# Patient Record
Sex: MALE | Race: WHITE | Employment: FULL TIME | URBAN - NONMETROPOLITAN AREA
[De-identification: names, ages, dates, MRNs, and addresses within clinical notes are randomized per-mention and may not be internally consistent; named-entity substitution may affect disease eponyms.]

---

## 2017-01-11 ENCOUNTER — OFFICE VISIT (OUTPATIENT)
Dept: OTHER | Age: 62
End: 2017-01-11

## 2017-01-11 VITALS
DIASTOLIC BLOOD PRESSURE: 58 MMHG | WEIGHT: 226 LBS | BODY MASS INDEX: 29.95 KG/M2 | HEIGHT: 73 IN | SYSTOLIC BLOOD PRESSURE: 102 MMHG | HEART RATE: 72 BPM

## 2017-01-11 DIAGNOSIS — Z23 NEED FOR IMMUNIZATION AGAINST VIRAL HEPATITIS: ICD-10-CM

## 2017-01-11 DIAGNOSIS — E11.9 TYPE 2 DIABETES MELLITUS WITHOUT COMPLICATION, WITH LONG-TERM CURRENT USE OF INSULIN (HCC): Primary | Chronic | ICD-10-CM

## 2017-01-11 DIAGNOSIS — Z79.4 TYPE 2 DIABETES MELLITUS WITHOUT COMPLICATION, WITH LONG-TERM CURRENT USE OF INSULIN (HCC): Primary | Chronic | ICD-10-CM

## 2017-01-11 PROCEDURE — G0108 DIAB MANAGE TRN  PER INDIV: HCPCS | Performed by: REGISTERED NURSE

## 2017-01-11 PROCEDURE — 90471 IMMUNIZATION ADMIN: CPT | Performed by: REGISTERED NURSE

## 2017-01-11 PROCEDURE — 90746 HEPB VACCINE 3 DOSE ADULT IM: CPT | Performed by: REGISTERED NURSE

## 2017-01-12 ENCOUNTER — TELEPHONE (OUTPATIENT)
Dept: OTHER | Age: 62
End: 2017-01-12

## 2017-04-21 LAB
AVERAGE GLUCOSE: NORMAL
HBA1C MFR BLD: 6.6 %

## 2017-07-19 ENCOUNTER — HOSPITAL ENCOUNTER (OUTPATIENT)
Dept: PHARMACY | Age: 62
Setting detail: THERAPIES SERIES
Discharge: HOME | End: 2017-07-19
Payer: COMMERCIAL

## 2017-07-19 VITALS
DIASTOLIC BLOOD PRESSURE: 67 MMHG | HEART RATE: 70 BPM | BODY MASS INDEX: 29.29 KG/M2 | SYSTOLIC BLOOD PRESSURE: 118 MMHG | WEIGHT: 222 LBS

## 2017-07-19 PROCEDURE — 90471 IMMUNIZATION ADMIN: CPT

## 2017-07-19 PROCEDURE — G0463 HOSPITAL OUTPT CLINIC VISIT: HCPCS

## 2017-07-19 PROCEDURE — G0010 ADMIN HEPATITIS B VACCINE: HCPCS

## 2017-07-19 PROCEDURE — 6360000002 HC RX W HCPCS

## 2017-07-19 PROCEDURE — 90743 HEPB VACC 2 DOSE ADOLESC IM: CPT

## 2017-07-19 PROCEDURE — 96372 THER/PROPH/DIAG INJ SC/IM: CPT

## 2017-07-19 RX ADMIN — HEPATITIS B VACCINE (RECOMBINANT) 10 MCG: 10 INJECTION, SUSPENSION INTRAMUSCULAR; SUBCUTANEOUS at 16:33

## 2018-01-17 ENCOUNTER — HOSPITAL ENCOUNTER (OUTPATIENT)
Dept: PHARMACY | Age: 63
Setting detail: THERAPIES SERIES
Discharge: HOME | End: 2018-01-17
Payer: COMMERCIAL

## 2018-01-17 VITALS
DIASTOLIC BLOOD PRESSURE: 60 MMHG | SYSTOLIC BLOOD PRESSURE: 122 MMHG | WEIGHT: 220.2 LBS | BODY MASS INDEX: 29.18 KG/M2 | HEIGHT: 73 IN

## 2018-01-17 PROCEDURE — G0108 DIAB MANAGE TRN  PER INDIV: HCPCS | Performed by: REGISTERED NURSE

## 2018-01-17 NOTE — PROGRESS NOTES
Diabetes Clinic at [address] [place]. [address], [address]  [phone] (phone)  [phone] (fax)    Patient ID: Eulogio Quinn 1955  Referring Provider: Dr. Lv Yarbrough     Patient's name and  were verified. Subjective:    He presents for His follow-up diabetic visit. He has type 2 diabetes mellitus. Home regimen includes: insulin  biguanide  TZD  GLP-1 agonist He is noncompliant some of the time. Assessment:     Lab Results   Component Value Date    LABA1C 6.6 2017    BUN 26 2015    CREATININE 1.2 2015     There were no vitals filed for this visit. Wt Readings from Last 3 Encounters:   17 222 lb (100.7 kg)   17 226 lb (102.5 kg)   16 226 lb 3.2 oz (102.6 kg)     Ht Readings from Last 3 Encounters:   17 6' 1\" (1.854 m)   11/18/15 6' 1\" (1.854 m)   01/14/15 6' 1\" (1.854 m)       Glucose at 4 hrs PPD today resulted at 72mg/dl. Roll of smarties. Current monitoring regimen: home blood tests - 2 times daily  Home blood sugar trends: FBS's . -163  Any episodes of hypoglycemia? no  Previous visit with dietician: remote  Current diet: B 6am cherrios                       L  [place] 1 bread/ cheese/ apple/ banana                       D 5-6pm 15 in pizza or bkfst sandwich/ chips  Current exercise: ADL's- moderate activity. workss 5-- 8 hr days  Eye exam current (within one year): yes Emma Jewell [facility]. 2017  Any history of foot problems? no  Last foot exam: 18 Pedal pulses:   peripheral pulses symmetrical   Results of monofilament test: 10/10              Skin noted to be cool, dusky and shiny and hair is absent. Denies N/T but does have pain in arch of feet/ ankles with weight bearing  Immunizations up to date: yes -   Taking ASA:  Yes   Appropriate for use of MyChart Glucose Grid:  No    Focus: Follow up visit. At this time Devonte's BS's are in goal fasting and bedtime.  He reports that his most recent A1C at his PCP

## 2018-06-27 LAB
AVERAGE GLUCOSE: NORMAL
HBA1C MFR BLD: 6.3 %

## 2018-07-18 ENCOUNTER — NURSE ONLY (OUTPATIENT)
Dept: INTERNAL MEDICINE CLINIC | Age: 63
End: 2018-07-18
Payer: COMMERCIAL

## 2018-07-18 VITALS
WEIGHT: 223.6 LBS | BODY MASS INDEX: 29.63 KG/M2 | SYSTOLIC BLOOD PRESSURE: 116 MMHG | DIASTOLIC BLOOD PRESSURE: 60 MMHG | HEIGHT: 73 IN

## 2018-07-18 DIAGNOSIS — Z79.4 TYPE 2 DIABETES MELLITUS WITHOUT COMPLICATION, WITH LONG-TERM CURRENT USE OF INSULIN (HCC): Chronic | ICD-10-CM

## 2018-07-18 DIAGNOSIS — E11.9 TYPE 2 DIABETES MELLITUS WITHOUT COMPLICATION, WITH LONG-TERM CURRENT USE OF INSULIN (HCC): Chronic | ICD-10-CM

## 2018-07-18 PROCEDURE — 99999 PR OFFICE/OUTPT VISIT,PROCEDURE ONLY: CPT | Performed by: NURSE PRACTITIONER

## 2018-07-18 PROCEDURE — G0108 DIAB MANAGE TRN  PER INDIV: HCPCS | Performed by: NURSE PRACTITIONER

## 2018-07-18 RX ORDER — INSULIN DETEMIR 100 [IU]/ML
15 INJECTION, SOLUTION SUBCUTANEOUS NIGHTLY
COMMUNITY
Start: 2018-07-05

## 2018-07-18 RX ORDER — LIRAGLUTIDE 6 MG/ML
1.2 INJECTION SUBCUTANEOUS DAILY
COMMUNITY
Start: 2018-07-05 | End: 2019-01-16 | Stop reason: SDUPTHER

## 2018-07-18 NOTE — PROGRESS NOTES
especially with age. Also reviewed limits to excess carb intake-thinking smart. Encouragment given. Follow up 6 months. DSME PLAN:   Discussed general issues about diabetes pathophysiology and management. Counseling at today's visit: carb goals; exercise; medication action. 1. Stay focused on 3 meals per day and 60 grams carb per meal          Limit splurges to less than once weekly  2. Get exercise program in place              Monday through Friday--as soon as you get home from              [facility] [place] your bike 10-15 minutes until dinner is ready  3. Victoza--needs to be taken daily in AM 1.2mg   Any questions, call! [phone]    Meter download, medications, PMH and nursing assessment reviewed. Sameer Pimentel states He is willing to participate in this plan of care and verbalized understanding of all instructions provided. Teach back used to verify comprehension. Total time involved in direct patient education: 60 minutes.

## 2019-01-16 ENCOUNTER — TELEPHONE (OUTPATIENT)
Dept: CARE COORDINATION | Age: 64
End: 2019-01-16

## 2019-01-16 ENCOUNTER — OFFICE VISIT (OUTPATIENT)
Dept: INTERNAL MEDICINE CLINIC | Age: 64
End: 2019-01-16
Payer: COMMERCIAL

## 2019-01-16 VITALS
SYSTOLIC BLOOD PRESSURE: 116 MMHG | WEIGHT: 217 LBS | DIASTOLIC BLOOD PRESSURE: 64 MMHG | HEIGHT: 73 IN | BODY MASS INDEX: 28.76 KG/M2

## 2019-01-16 DIAGNOSIS — E11.9 TYPE 2 DIABETES MELLITUS WITHOUT COMPLICATION, WITH LONG-TERM CURRENT USE OF INSULIN (HCC): Chronic | ICD-10-CM

## 2019-01-16 DIAGNOSIS — Z79.4 TYPE 2 DIABETES MELLITUS WITHOUT COMPLICATION, WITH LONG-TERM CURRENT USE OF INSULIN (HCC): Chronic | ICD-10-CM

## 2019-01-16 PROCEDURE — G0108 DIAB MANAGE TRN  PER INDIV: HCPCS | Performed by: INTERNAL MEDICINE

## 2019-02-11 LAB
AVERAGE GLUCOSE: NORMAL
HBA1C MFR BLD: 6.5 %

## 2019-05-01 ENCOUNTER — OFFICE VISIT (OUTPATIENT)
Dept: INTERNAL MEDICINE CLINIC | Age: 64
End: 2019-05-01
Payer: MEDICARE

## 2019-05-01 VITALS
WEIGHT: 219.5 LBS | HEIGHT: 73 IN | SYSTOLIC BLOOD PRESSURE: 138 MMHG | DIASTOLIC BLOOD PRESSURE: 63 MMHG | BODY MASS INDEX: 29.09 KG/M2

## 2019-05-01 DIAGNOSIS — Z79.4 TYPE 2 DIABETES MELLITUS WITHOUT COMPLICATION, WITH LONG-TERM CURRENT USE OF INSULIN (HCC): Chronic | ICD-10-CM

## 2019-05-01 DIAGNOSIS — E11.9 TYPE 2 DIABETES MELLITUS WITHOUT COMPLICATION, WITH LONG-TERM CURRENT USE OF INSULIN (HCC): Chronic | ICD-10-CM

## 2019-05-01 PROCEDURE — G0108 DIAB MANAGE TRN  PER INDIV: HCPCS | Performed by: INTERNAL MEDICINE

## 2019-05-01 NOTE — PATIENT INSTRUCTIONS
Continue with your exercise program and your medication  Watch for low blood sugars.  2-3 times per week is too often to have lows --report to the clinic  Keep an eye on calories in your daily meal plan            -substitute fruit or yogurt for chips once in a while            -limit cookies to 2 vs 4 and add sugar free jello            -limit high fat meats and fried foods  Keep up the great work!!

## 2019-05-01 NOTE — PROGRESS NOTES
[facility]  [facility] [address] [facility], [address]  [phone] (phone)  [phone] (fax)    Patient ID: Aron Paget 1955  Referring Provider: Dr. Polo Giles     Patient's name and  were verified. Subjective:    He presents for His follow-up diabetic visit. He has type 2 diabetes mellitus. Home regimen includes: insulin  biguanide  GLP-1 agonist and TZD. He is compliant most of the time. Assessment:     Lab Results   Component Value Date    LABA1C 6.3 2018    BUN 26 2015    CREATININE 1.2 2015     Vitals:    19 1015   BP: 138/63   Site: Right Upper Arm   Position: Sitting   Weight: 219 lb 8 oz (99.6 kg)   Height: 6' 1\" (1.854 m)     Wt Readings from Last 3 Encounters:   19 219 lb 8 oz (99.6 kg)   19 217 lb (98.4 kg)   18 223 lb 9.6 oz (101.4 kg)     Ht Readings from Last 3 Encounters:   19 6' 1\" (1.854 m)   19 6' 1\" (1.854 m)   18 6' 1\" (1.854 m)       Current monitoring regimen: home blood tests - 2 times daily  Home blood sugar trends: FBS's . Bedtime 118-230 (varied)  Any episodes of hypoglycemia? no  Previous visit with dietician: remote  Current diet: B- cherrios/ coffee                       L- sandwich/ potato chips                       D- Sausage/ fried potato/ mixed veggie/ brocoli                       Snacks--cookies/ candy  Current exercise: 30 mins 5 times per week; recumbant bike  Eye exam current (within one year): yes 10/19/18 Dr. Charisma Mcgee  Any history of foot problems? no  Last foot exam: 19  Immunizations up to date: yes -   Taking ASA:  Yes   Appropriate for use of MyChart Glucose Grid:  No    Focus: Follow up visit. Harrison Rivera has been retired for approximately 3 months. He is doing quite well. He is riding his recumbent bike 5 of 7 days. There are areas of his meal plan that can be addressed, but he declines seeing a dietician.  Reviewed reducing intake of high fat meats/ fried foods and sweets. Also reviewed how these choices cause weight gain/ increase strain on pancreas function. Weight is up 2# today. Much encouragement given. Will follow up 6 months. DSME PLAN:   Discussed general issues about diabetes pathophysiology and management. Counseling at today's visit: BG goals; exercise; carbs; preserving beta cell fx. Continue with your exercise program and your medication  Watch for low blood sugars. 2-3 times per week is too often to have lows --report to the clinic  Keep an eye on calories in your daily meal plan            -substitute fruit or yogurt for chips once in a while            -limit cookies to 2 vs 4 and add sugar free jello            -limit high fat meats and fried foods  Keep up the great work!!  Meter download, medications, PMH and nursing assessment reviewed. Ramandeep De La Cruz states He is willing to participate in this plan of care and verbalized understanding of all instructions provided. Teach back used to verify comprehension.       Total time involved in direct patient education: 30 minutes. '

## 2019-08-09 LAB
AVERAGE GLUCOSE: NORMAL
HBA1C MFR BLD: 6.3 %

## 2019-11-06 ENCOUNTER — OFFICE VISIT (OUTPATIENT)
Dept: INTERNAL MEDICINE CLINIC | Age: 64
End: 2019-11-06
Payer: MEDICARE

## 2019-11-06 VITALS
DIASTOLIC BLOOD PRESSURE: 58 MMHG | WEIGHT: 212 LBS | HEIGHT: 73 IN | BODY MASS INDEX: 28.1 KG/M2 | SYSTOLIC BLOOD PRESSURE: 124 MMHG

## 2019-11-06 DIAGNOSIS — Z23 NEED FOR PROPHYLACTIC VACCINATION AND INOCULATION AGAINST INFLUENZA: Primary | ICD-10-CM

## 2019-11-06 PROCEDURE — 90653 IIV ADJUVANT VACCINE IM: CPT | Performed by: INTERNAL MEDICINE

## 2019-11-06 PROCEDURE — G0008 ADMIN INFLUENZA VIRUS VAC: HCPCS | Performed by: INTERNAL MEDICINE

## 2019-11-06 PROCEDURE — G0108 DIAB MANAGE TRN  PER INDIV: HCPCS | Performed by: INTERNAL MEDICINE

## 2020-02-07 LAB
AVERAGE GLUCOSE: NORMAL
HBA1C MFR BLD: 5.9 %

## 2020-02-08 LAB
CREATININE, URINE: 190.71
MICROALBUMIN/CREAT 24H UR: 0.7 MG/G{CREAT}
MICROALBUMIN/CREAT UR-RTO: 3.7

## 2020-08-05 ENCOUNTER — OFFICE VISIT (OUTPATIENT)
Dept: INTERNAL MEDICINE CLINIC | Age: 65
End: 2020-08-05
Payer: MEDICARE

## 2020-08-05 VITALS
SYSTOLIC BLOOD PRESSURE: 128 MMHG | DIASTOLIC BLOOD PRESSURE: 61 MMHG | WEIGHT: 219 LBS | HEART RATE: 65 BPM | BODY MASS INDEX: 29.03 KG/M2 | TEMPERATURE: 97.8 F | HEIGHT: 73 IN

## 2020-08-05 LAB — HBA1C MFR BLD: 6.5 % (ref 4.3–5.7)

## 2020-08-05 PROCEDURE — G0108 DIAB MANAGE TRN  PER INDIV: HCPCS | Performed by: INTERNAL MEDICINE

## 2020-08-05 PROCEDURE — 83036 HEMOGLOBIN GLYCOSYLATED A1C: CPT | Performed by: INTERNAL MEDICINE

## 2020-08-05 RX ORDER — SERTRALINE HYDROCHLORIDE 100 MG/1
100 TABLET, FILM COATED ORAL DAILY
COMMUNITY
Start: 2020-05-04

## 2020-08-05 NOTE — PROGRESS NOTES
[facility]  [address] [facility], [place], [address]  [phone] (phone)  [phone] (fax)    Patient ID: Dipika Lynn 1955  Referring Provider: Dr. Veronica Pappas     Patient's name and  were verified. Subjective:    He presents for His follow-up diabetic visit. He has type 2 diabetes mellitus. Home regimen includes: insulin  biguanide  TZD  GLP-1 agonist He is compliant most of the time. Assessment:     Lab Results   Component Value Date    LABA1C 6.3 2019    BUN 26 2015    CREATININE 1.2 2015     There were no vitals filed for this visit. Wt Readings from Last 3 Encounters:   19 212 lb (96.2 kg)   19 219 lb 8 oz (99.6 kg)   19 217 lb (98.4 kg)     Ht Readings from Last 3 Encounters:   19 6' 1\" (1.854 m)   19 6' 1\" (1.854 m)   19 6' 1\" (1.854 m)       Glucose at 3 hrs PPD today resulted at 120mg/dl  Current monitoring regimen: home blood tests - 2 times daily  Home blood sugar trends: FBS's        Bedtime 110-156, 187, 200, 186  Any episodes of hypoglycemia? no  Previous visit with dietician: remote  Current diet: B cherrios/ coffee                  L mac n cheese 2 cups                  D cheese burger/ fries/ ice cone                 Snacks - nuts  Current exercise: ADL's moderate activity; got away from recumbant bike daily  Eye exam current (within one year): yes Dr. Jhonny Lopes 19  Any history of foot problems? no  Last foot exam: 2020 Pedal pulses:   peripheral pulses symmetrical   Results of monofilament test: 10/10              Skin noted to be warm, pale and hair is absent. Immunizations up to date: yes - 2019  Taking ASA:  Yes   Appropriate for use of MyChart Glucose Grid:  No    Focus:     Diabetes education. Faviola Wells continues to do well with glucose control, overall. He is up 7# from last visit and exercise has been put on hold. A1C today 6.5%.  Focused on keeping glucose levels in goal with resuming exercise efforts and getting weight back down again. Soni Contreras is receptive to taking this approach. Encouragement given. Follow up 6 months. DSME PLAN:   Discussed general issues about diabetes pathophysiology and management. Counseling at today's visit: BG goals; carbs; exercise; weight loss. Get back into riding your recumbant bike at least 15-20 minutes most days  Watch your serving sizes! Try to limit meals to 4 servings carbohydrates at meals (60gms)                             Limit snacking on nuts to 1 handful  Set a goal to get your weight down the 7 pounds that you have recently gained      Meter download, medications, PMH and nursing assessment reviewed. Veleta Runner states He is willing to participate in this plan of care and verbalized understanding of all instructions provided. Teach back used to verify comprehension. Total time involved in direct patient education: 30 minutes.

## 2020-09-22 LAB
BUN BLDV-MCNC: 14 MG/DL
CALCIUM SERPL-MCNC: 9.5 MG/DL
CHLORIDE BLD-SCNC: 102 MMOL/L
CO2: 32 MMOL/L
CREAT SERPL-MCNC: 0.82 MG/DL
GFR CALCULATED: >60
GLUCOSE BLD-MCNC: 107 MG/DL
POTASSIUM SERPL-SCNC: 4.4 MMOL/L
SODIUM BLD-SCNC: 142 MMOL/L

## 2021-02-10 ENCOUNTER — OFFICE VISIT (OUTPATIENT)
Dept: INTERNAL MEDICINE CLINIC | Age: 66
End: 2021-02-10
Payer: MEDICARE

## 2021-02-10 VITALS
BODY MASS INDEX: 28.89 KG/M2 | HEART RATE: 70 BPM | WEIGHT: 218 LBS | SYSTOLIC BLOOD PRESSURE: 130 MMHG | DIASTOLIC BLOOD PRESSURE: 73 MMHG | TEMPERATURE: 97.2 F | HEIGHT: 73 IN

## 2021-02-10 DIAGNOSIS — Z79.4 TYPE 2 DIABETES MELLITUS WITHOUT COMPLICATION, WITH LONG-TERM CURRENT USE OF INSULIN (HCC): Primary | ICD-10-CM

## 2021-02-10 DIAGNOSIS — E11.9 TYPE 2 DIABETES MELLITUS WITHOUT COMPLICATION, WITH LONG-TERM CURRENT USE OF INSULIN (HCC): Primary | ICD-10-CM

## 2021-02-10 LAB — HBA1C MFR BLD: 6.7 % (ref 4.3–5.7)

## 2021-02-10 PROCEDURE — 83036 HEMOGLOBIN GLYCOSYLATED A1C: CPT | Performed by: INTERNAL MEDICINE

## 2021-02-10 PROCEDURE — G0108 DIAB MANAGE TRN  PER INDIV: HCPCS | Performed by: INTERNAL MEDICINE

## 2021-02-10 NOTE — PROGRESS NOTES
[facility]  [address] [facility], [address]  [phone] (phone)  [phone] (fax)    Patient ID: Moreno Cha 1955  Referring Provider: Dr. Debra Gomez     Patient's name and  were verified. Subjective:    He presents for His follow-up diabetic visit. He has type 2 diabetes mellitus. Home regimen includes: insulin  biguanide  TZD  GLP-1 agonist He is noncompliant some of the time. Assessment:     Lab Results   Component Value Date    LABA1C 6.5 2020    LABA1C 6.3 2019    BUN 26 2015    CREATININE 1.2 2015     There were no vitals filed for this visit. Wt Readings from Last 3 Encounters:   20 219 lb (99.3 kg)   19 212 lb (96.2 kg)   19 219 lb 8 oz (99.6 kg)     Ht Readings from Last 3 Encounters:   20 6' 1\" (1.854 m)   19 6' 1\" (1.854 m)   19 6' 1\" (1.854 m)       Glucose at 2.5 hrs PPD today resulted at 119mg/dl  Current monitoring regimen: home blood tests - 2 times daily  Home blood sugar trends: FBS's . Bedtime 109-156  Any episodes of hypoglycemia? no  Previous visit with dietician: remote  Current diet: B coffee/ cereal                     L summer sausage sandwich                     D pork roast/ potato                     Snacking--evening-nuts  Current exercise: ADL's- moderate activity. Has recumbant bike  Eye exam current (within one year): yes  2021 Dr. Rosana Bradley   Any history of foot problems? no  Last foot exam: 2020 Dr. Graeme Radford podiatry every 12 weeks.  Last appt 2020 Appt 3/17/2021  Immunizations up to date: yes -   Taking ASA:  Yes   Appropriate for use of MyChart Glucose Grid:  No    Focus: Diabetes education. A1C today 6.7%. Weight is up 6# from last visit-6 months. Nisha Decker reports he has gotten away from riding his bike, but agrees to resume this effort. He does not follow a meal plan and there is room for improvement, but overall he is doing well. Discussed the need to keep glucose levels in goal. He is interested in CGM, but it is not likely that Medicare will cover as he is only taking 1 insulin daily. He is going to check on coverage. Follow up 6 months. DSME PLAN:   Discussed general issues about diabetes pathophysiology and management. Counseling at today's visit: BG goals; carbs; exercise; CGM; medication action. Get exercise routine back in place             -ride your bike 15 minutes almost every day-after breakfast or after            Volunteering at [employer] in the morning             --build up to 20-30 minutes most days. Limit the amount of your snack in the evening             --put your serving in a bowl and put the package away            Avoid \"mindless eating\"  Stay focused on blood sugars goals   Meter download, medications, PMH and nursing assessment reviewed. Israel Silvestre states He is willing to participate in this plan of care and verbalized understanding of all instructions provided. Teach back used to verify comprehension. Total time involved in direct patient education: 60 minutes.

## 2021-03-12 LAB
CREATININE, URINE: 272.25
MICROALBUMIN/CREAT 24H UR: 1.5 MG/G{CREAT}
MICROALBUMIN/CREAT UR-RTO: 5.5

## 2021-08-25 ENCOUNTER — OFFICE VISIT (OUTPATIENT)
Dept: INTERNAL MEDICINE CLINIC | Age: 66
End: 2021-08-25
Payer: MEDICARE

## 2021-08-25 DIAGNOSIS — Z79.4 TYPE 2 DIABETES MELLITUS WITHOUT COMPLICATION, WITH LONG-TERM CURRENT USE OF INSULIN (HCC): Primary | ICD-10-CM

## 2021-08-25 DIAGNOSIS — E11.9 TYPE 2 DIABETES MELLITUS WITHOUT COMPLICATION, WITH LONG-TERM CURRENT USE OF INSULIN (HCC): Primary | ICD-10-CM

## 2021-08-25 LAB — HBA1C MFR BLD: 6.3 % (ref 4.3–5.7)

## 2021-08-25 PROCEDURE — G0108 DIAB MANAGE TRN  PER INDIV: HCPCS | Performed by: INTERNAL MEDICINE

## 2021-08-25 PROCEDURE — 83036 HEMOGLOBIN GLYCOSYLATED A1C: CPT | Performed by: INTERNAL MEDICINE

## 2021-08-25 RX ORDER — HYDROCODONE BITARTRATE AND ACETAMINOPHEN 5; 325 MG/1; MG/1
1 TABLET ORAL EVERY 4 HOURS PRN
COMMUNITY
Start: 2021-08-19 | End: 2021-12-08

## 2021-08-25 ASSESSMENT — PATIENT HEALTH QUESTIONNAIRE - PHQ9
1. LITTLE INTEREST OR PLEASURE IN DOING THINGS: NOT AT ALL
SUM OF ALL RESPONSES TO PHQ9 QUESTIONS 1 & 2: 0
2. FEELING DOWN, DEPRESSED OR HOPELESS: NOT AT ALL
DEPRESSION UNABLE TO ASSESS: YES

## 2021-08-25 NOTE — PATIENT INSTRUCTIONS
Focus on getting bedtime blood sugars under better control   -try to ride bike 5-10 minutes after supper   -keep working on limiting carbohydrates at supper  Set a goal to check blood sugars before lunch and either before dinner                Or 2 hours after lunch or dinner                 Before meal blood sugars should be                           2hrs after a meal should be under 150/160  If only dinner readings are running high --you could consider taking your            Levemir with dinner instead of waiting until bedtime  Tues after Labor day --call the clinci if you are interested in the                        Diagnostic [facility]) for better evaluation of blood                            Sugars                         [phone]

## 2021-08-25 NOTE — PROGRESS NOTES
[facility]  [facility] [address] [place], [address]  [phone] (phone)  [phone] (fax)    Patient ID: Zohaib Harvey 1955  Referring Provider: Dr. Karen Mccarty     Patient's name and  were verified. Subjective:    He presents for His follow-up diabetic visit. He has type 2 diabetes mellitus. Home regimen includes: Insulin, Biguanide, TZD and GLP-1 Agonist He is noncompliant some of the time. Assessment:     Lab Results   Component Value Date    LABA1C 6.7 02/10/2021    LABA1C 6.3 2019    BUN 26 2015    CREATININE 1.2 2015     There were no vitals filed for this visit. Wt Readings from Last 3 Encounters:   02/10/21 218 lb (98.9 kg)   20 219 lb (99.3 kg)   19 212 lb (96.2 kg)     Ht Readings from Last 3 Encounters:   02/10/21 6' 1\" (1.854 m)   20 6' 1\" (1.854 m)   19 6' 1\" (1.854 m)       Glucose at 2 hrs PPD today resulted at 117mg/dl  Current monitoring regimen: home blood tests - 2 times daily  Home blood sugar trends: FBS's \" 101-117\" BT \"117, 145-200\"  Any episodes of hypoglycemia? no  Depression screening completed 2021  Previous visit with dietician: remote  Current diet:  B cherrios                        L ham sandwich/ potato chips/ grapes                        D pizza 5 pieces small--thin crust                         BT nuts                       Does not count carbs  Current exercise: ADL's - outdoor activity  Eye exam current (within one year): yes 21 Dr. Milind Raymond  Any history of foot problems? no  Last foot exam: podiatry every 12 weeks Dr. Kamila Spencer                                   appt . Last seen 2021  Immunizations up to date: yes -   Taking ASA:  Yes   Appropriate for use of MyChart Glucose Grid:  No    Focus:     Diabetes education. A1C today 6.3%. Wally Harmon reports recent history of kidney stones.  He has passed one with significant pain, but states that scans show he has more stones that could move. Activity has been limited to outdoor summer projects. He did not bring his meter, but reports most BS's are in goal. There are a few elevated bedtime readings r/t to food choices. Weight is down about 3# from last visit. Overall, Ai Peralta is doing well. Discussed the need to get activity plan in place. Reviewed the actions of his Diabetes medications taken. Follow up 5 months. DSME PLAN:   Discussed general issues about diabetes pathophysiology and management. Counseling at today's visit: BG goals; carbs; exercise; healthy food choices; medication actions; treating low BS. Focus on getting bedtime blood sugars under better control   -try to ride bike 5-10 minutes after supper   -keep working on limiting carbohydrates at supper  Set a goal to check blood sugars before lunch and either before dinner                Or 2 hours after lunch or dinner                 Before meal blood sugars should be                           2hrs after a meal should be under 150/160  If only dinner readings are running high --you could consider taking your            Levemir with dinner instead of waiting until bedtime  Tues after Labor day --call the clinci if you are interested in the                        Diagnostic [facility]) for better evaluation of blood                            Sugars                         [phone]  Meter download, medications, PMH and nursing assessment reviewed. Chevysandi Harleen states He is willing to participate in this plan of care and verbalized understanding of all instructions provided. Teach back used to verify comprehension. Total time involved in direct patient education: 60 minutes.

## 2021-08-31 VITALS
DIASTOLIC BLOOD PRESSURE: 63 MMHG | SYSTOLIC BLOOD PRESSURE: 136 MMHG | HEART RATE: 68 BPM | TEMPERATURE: 96.8 F | BODY MASS INDEX: 28.49 KG/M2 | WEIGHT: 215 LBS | HEIGHT: 73 IN

## 2021-12-07 LAB
ALBUMIN SERPL-MCNC: 4.1 G/DL
ALP BLD-CCNC: 96 U/L
ALT SERPL-CCNC: 14 U/L
ANION GAP SERPL CALCULATED.3IONS-SCNC: 11 MMOL/L
AST SERPL-CCNC: 19 U/L
BILIRUB SERPL-MCNC: 0.5 MG/DL (ref 0.1–1.4)
BUN BLDV-MCNC: 23 MG/DL
CALCIUM SERPL-MCNC: 9.1 MG/DL
CHLORIDE BLD-SCNC: 103 MMOL/L
CHOLESTEROL, TOTAL: 153 MG/DL
CHOLESTEROL/HDL RATIO: 3.9
CO2: 25 MMOL/L
CREAT SERPL-MCNC: 1.38 MG/DL
GFR CALCULATED: 51
GLUCOSE BLD-MCNC: 125 MG/DL
HDLC SERPL-MCNC: 39 MG/DL (ref 35–70)
LDL CHOLESTEROL CALCULATED: 100 MG/DL (ref 0–160)
NONHDLC SERPL-MCNC: NORMAL MG/DL
POTASSIUM SERPL-SCNC: 4.8 MMOL/L
SODIUM BLD-SCNC: 139 MMOL/L
TOTAL PROTEIN: 7
TRIGL SERPL-MCNC: 72 MG/DL
VLDLC SERPL CALC-MCNC: 14 MG/DL

## 2021-12-08 PROBLEM — F32.A DEPRESSIVE DISORDER: Status: ACTIVE | Noted: 2021-12-08

## 2021-12-08 PROBLEM — I10 ESSENTIAL HYPERTENSION: Status: ACTIVE | Noted: 2021-12-08

## 2021-12-08 PROBLEM — N18.2 STAGE 2 CHRONIC KIDNEY DISEASE: Status: ACTIVE | Noted: 2017-04-24

## 2021-12-08 PROBLEM — R60.9 EDEMA: Status: ACTIVE | Noted: 2021-12-08

## 2021-12-08 PROBLEM — E78.00 HYPERCHOLESTEROLEMIA: Status: ACTIVE | Noted: 2021-12-08

## 2021-12-08 RX ORDER — TAMSULOSIN HYDROCHLORIDE 0.4 MG/1
0.4 CAPSULE ORAL DAILY
COMMUNITY
Start: 2021-10-13

## 2021-12-17 ENCOUNTER — OFFICE VISIT (OUTPATIENT)
Dept: NEPHROLOGY | Age: 66
End: 2021-12-17
Payer: MEDICARE

## 2021-12-17 VITALS
TEMPERATURE: 97.3 F | HEART RATE: 70 BPM | DIASTOLIC BLOOD PRESSURE: 77 MMHG | BODY MASS INDEX: 28.76 KG/M2 | WEIGHT: 218 LBS | OXYGEN SATURATION: 97 % | SYSTOLIC BLOOD PRESSURE: 135 MMHG

## 2021-12-17 DIAGNOSIS — I12.9 HYPERTENSIVE RENAL DISEASE, STAGE 1 THROUGH STAGE 4 OR UNSPECIFIED CHRONIC KIDNEY DISEASE: ICD-10-CM

## 2021-12-17 DIAGNOSIS — R79.89 ELEVATED SERUM CREATININE: Primary | ICD-10-CM

## 2021-12-17 PROCEDURE — 3017F COLORECTAL CA SCREEN DOC REV: CPT | Performed by: INTERNAL MEDICINE

## 2021-12-17 PROCEDURE — G8484 FLU IMMUNIZE NO ADMIN: HCPCS | Performed by: INTERNAL MEDICINE

## 2021-12-17 PROCEDURE — 99204 OFFICE O/P NEW MOD 45 MIN: CPT | Performed by: INTERNAL MEDICINE

## 2021-12-17 PROCEDURE — 1123F ACP DISCUSS/DSCN MKR DOCD: CPT | Performed by: INTERNAL MEDICINE

## 2021-12-17 PROCEDURE — 4040F PNEUMOC VAC/ADMIN/RCVD: CPT | Performed by: INTERNAL MEDICINE

## 2021-12-17 PROCEDURE — G8417 CALC BMI ABV UP PARAM F/U: HCPCS | Performed by: INTERNAL MEDICINE

## 2021-12-17 PROCEDURE — 1036F TOBACCO NON-USER: CPT | Performed by: INTERNAL MEDICINE

## 2021-12-17 PROCEDURE — G8427 DOCREV CUR MEDS BY ELIG CLIN: HCPCS | Performed by: INTERNAL MEDICINE

## 2021-12-17 NOTE — PROGRESS NOTES
Occupation:      Employer: [employer]   Tobacco Use    Smoking status: Never Smoker    Smokeless tobacco: Never Used   Substance and Sexual Activity    Alcohol use: Yes     Alcohol/week: 2.0 standard drinks     Types: 2 Cans of beer per week     Comment: 1-2 times per months; light beer    Drug use: Not on file    Sexual activity: Not on file   Other Topics Concern    Not on file   Social History Narrative    Not on file     Social Determinants of Health     Financial Resource Strain:     Difficulty of Paying Living Expenses: Not on file   Food Insecurity:     Worried About Running Out of Food in the Last Year: Not on file    Raquel of Food in the Last Year: Not on file   Transportation Needs:     Lack of Transportation (Medical): Not on file    Lack of Transportation (Non-Medical):  Not on file   Physical Activity:     Days of Exercise per Week: Not on file    Minutes of Exercise per Session: Not on file   Stress:     Feeling of Stress : Not on file   Social Connections:     Frequency of Communication with Friends and Family: Not on file    Frequency of Social Gatherings with Friends and Family: Not on file    Attends Roman [employer] Services: Not on file    Active Member of [employer] Group or Organizations: Not on file    Attends Club or Organization Meetings: Not on file    Marital Status: Not on file   Intimate Partner Violence:     Fear of Current or Ex-Partner: Not on file    Emotionally Abused: Not on file    Physically Abused: Not on file    Sexually Abused: Not on file   Housing Stability:     Unable to Pay for Housing in the Last Year: Not on file    Number of [place] in the Last Year: Not on file    Unstable Housing in the Last Year: Not on file   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 Authorizing Provider   tamsulosin (FLOMAX) 0.4 MG capsule  10/13/21  Yes Historical Provider, MD   metFORMIN (GLUCOPHAGE) 1000 MG tablet Take 1,000 mg by mouth daily (with breakfast)   Yes Historical Provider, MD   sertraline (ZOLOFT) 100 MG tablet Take 100 mg by mouth daily 5/4/20  Yes Historical Provider, MD   Liraglutide (VICTOZA) 18 MG/3ML SOPN SC injection Inject 1.2 mg as directed daily   Yes Historical Provider, MD   LEVEMIR FLEXTOUCH 100 UNIT/ML injection pen Inject 18 Units into the skin nightly 7/5/18  Yes Historical Provider, MD   glucose blood VI test strips (ONE TOUCH ULTRA TEST) strip Test BID AD Dx: 250.02 10/15/14  Yes [facility] GAL Herzog   [place] LANCETS FINE MISC Test BID AD Dx: 250.02 10/15/14  Yes [facility] GAL Herzog   Insulin Pen Needle (PEN NEEDLES) 31G X 6 MM MISC Use as needed for Lantus 12/5/12 12/17/21 Yes Ari Herzog   Multiple Vitamin (MULTIVITAMIN PO) 1 Cap Oral DAILY    Yes Historical Provider, MD   lisinopril (PRINIVIL;ZESTRIL) 10 MG tablet 1 Tab Oral DAILY    Yes Historical Provider, MD   aspirin 81 MG chewable tablet 81 mg 1 Tab Oral MWF    Yes Historical Provider, MD   pioglitazone (ACTOS) 15 MG tablet 1 Tab Oral DAILY    Yes Historical Provider, MD        Physical Examination:  VITALS:  /77 (Site: Left Upper Arm, Position: Sitting, Cuff Size: Medium Adult)   Pulse 70   Temp 97.3 °F (36.3 °C)   Wt 218 lb (98.9 kg)   SpO2 97%   BMI 28.76 kg/m²   Body mass index is 28.76 kg/m².   Wt Readings from Last 3 Encounters:   12/17/21 218 lb (98.9 kg)   08/31/21 215 lb (97.5 kg)   02/10/21 218 lb (98.9 kg)     Constitutional and General Appearance: alert and cooperative with exam, appears comfortable, no distress, not diaphoretic  Ears and Nose: normal external appearance of left and right ear  Neck: No jugular venous distention  Lungs: Air entry B/L, no crackles or rales, no use of accessory muscles or labored breathing  Heart: regular rate, S1, S2  Extremities: No pitting LE edema, no tenderness  GI: soft, non-tender, no guarding, no distention  Skin: no rash seen on exposed extremities  Musculo: moves all extremities  Neuro: no slurred speech  Psychiatric: Normal mood and affect, Not agitated     Laboratory & Diagnostics:  Old progress notes from referring physician reviewed. Radiology/US kidneys:   Echo:   Old labs reviewed:  Dec 2021: K 4.8, creat 1.38, , Albumin 4.1, AST 19, ALT 14     Impression/Plan:   1. Mild elevated creatinine: old labs reviewed. Creat was 1.3 even back in 2014 and was 1.2 in Sept 2015. Appears he likely has some element of CKD III. But will send work-up including UA, urine protein-creat ratio and baseline kidney US to evaluate echogenicity and kidney size. Patient reports he only drinks about 16 ounces of water per day. I've advised him to increase water intake. Bring a log of BP readings. Advised less salt intake. Advised low red meat intake. 2. HTN: on lisinopril. Monitor BP at home and advised low salt diet. 3. IDDM: check UA, urine protein-creatinine ratio.    4. Enlarged prostate: on Flomax    Thought process was discussed with the patient  Thank you for the referral  Please do not hesitate to contact me if you have any questions or concerns  I will make further recommendations depending on clinical course and lab/diagnostic results      Orders Placed This Encounter   Procedures    US RENAL COMPLETE    Basic Metabolic Panel    Urinalysis    Protein / creatinine ratio, urine    CBC     Return in about 6 weeks (around 1/28/2022).       Ellie Gibbs MD  [facility]

## 2022-01-17 DIAGNOSIS — R79.89 ELEVATED SERUM CREATININE: ICD-10-CM

## 2022-01-26 ENCOUNTER — OFFICE VISIT (OUTPATIENT)
Dept: INTERNAL MEDICINE CLINIC | Age: 67
End: 2022-01-26
Payer: MEDICARE

## 2022-01-26 DIAGNOSIS — Z79.4 TYPE 2 DIABETES MELLITUS WITHOUT COMPLICATION, WITH LONG-TERM CURRENT USE OF INSULIN (HCC): Primary | ICD-10-CM

## 2022-01-26 DIAGNOSIS — E11.9 TYPE 2 DIABETES MELLITUS WITHOUT COMPLICATION, WITH LONG-TERM CURRENT USE OF INSULIN (HCC): Primary | ICD-10-CM

## 2022-01-26 LAB — HBA1C MFR BLD: 6.2 % (ref 4.3–5.7)

## 2022-01-26 PROCEDURE — 83036 HEMOGLOBIN GLYCOSYLATED A1C: CPT

## 2022-01-26 PROCEDURE — G0108 DIAB MANAGE TRN  PER INDIV: HCPCS

## 2022-01-26 PROCEDURE — 95250 CONT GLUC MNTR PHYS/QHP EQP: CPT | Performed by: INTERNAL MEDICINE

## 2022-01-26 NOTE — PROGRESS NOTES
Per Dr. Zo Smith orders, Winnie Villarrealurry presents for insertion of diagnostic FlowPlay professional CGM. Sy sensor inserted on Back of Left Upper Arm. Sensor initiated via [employer] and repeated per system directions. Patient instructed the system is water resistant and can shower. To reinforce sensor with tape as needed if it begins to peel away from the skin. Patient given food log and asked to record all food and drink intake for the period of time sensor is being worn. Instructed to return in 1 week for sensor scan to obtain glucose information and bring food logs. Instructed that in 2 weeks sensor is to be removed and returned to the Diabetes Clinic in a baggie with name and  and food logs for review.

## 2022-01-26 NOTE — PATIENT INSTRUCTIONS
1. Time to schedule your diabetes eye exam    2. Keep your food/snack/exercise log for the next 2 weeks   -Try to stop by in 1 week for a [employer] download    3.  Will talk to Dr. Fab Bustamante, but may trial decreasing/stopping metformin and increasing Victoza to 1.8mg daily (if needed)    Blood Sugar Goals:  -Fasting AM:    -Before lunch/dinner:    -2 hours after eating/at bedtime: 100-180

## 2022-01-26 NOTE — PROGRESS NOTES
[facility]  [address] [address] [address], [facility], [address]  [phone] (phone)  [phone] (fax)    Patient ID: Mervin Malcolm 1955  Referring Provider: Dr. Javier Moore     Patient's name and  were verified. Subjective:    He presents for His follow-up diabetic visit. He has type 2 diabetes mellitus. Home regimen includes: Insulin, Biguanide, TZD and GLP-1 Agonist He is compliant most of the time.      Assessment:     Lab Results   Component Value Date    LABA1C 6.2 2022    LABA1C 5.9 2020    BUN 23 2021    CREATININE 1.38 2021     Vitals:    22 0725   BP: 127/66   Pulse: 71   Temp: 97.7 °F (36.5 °C)   Weight: 213 lb 3.2 oz (96.7 kg)     Wt Readings from Last 3 Encounters:   22 213 lb 3.2 oz (96.7 kg)   21 218 lb (98.9 kg)   21 215 lb (97.5 kg)     Ht Readings from Last 3 Encounters:   21 6' 1\" (1.854 m)   02/10/21 6' 1\" (1.854 m)   20 6' 1\" (1.854 m)       Diabetes Pharmacotherapy:  Levemir 18 units nightly  Victoza 1.2 mg daily  Metformin 1000 mg daily  Pioglitazone 15 mg daily    Glucose Trends:   Glucose at 4 hrs PPD today resulted at 86 mg/dl  Current monitoring regimen: home blood tests - 2 times daily  Home blood sugar trends:    -Fasting AM:  (Average = 109)   -Bedtime: 112-199 (Average = 152)  Any episodes of hypoglycemia? no   -Treats with fruit juice    Lifestyle Factors:   Previous visit with dietician: remote  Current diet: B: cup of coffee, bowl of cheerios with milk             L: cold meat sandwich with one piece of bread, snack pudding, potato chips                       D: varies - last night had chicken and noodles                       Snacks: nuts at bedtime, sometimes a cookie or two between meals                       Beverages: coffee with breakfast, about 24 ounces of water a day  Current exercise: housecleaning, more yardwork in the summer     Health Maintenance:  Eye exam current (within one year): Victoza to 1.8mg daily (if needed)    Meter download, medications, PMH and nursing assessment reviewed. Eris Patel states He is willing to participate in this plan of care and verbalized understanding of all instructions provided. Teach back used to verify comprehension. Follow-up: 2 weeks to review Sy Pro readings    Total time involved in direct patient education: 30 minutes.      For Rafael Cano in place:  No   Recommendation Provided To: Provider: 1 via Called provider office   Intervention Detail: Patient Access Assistance/Sample Provided   Gap Closed?: No    Intervention Accepted By: Provider: 1   Time Spent (min): Mary, PharmD, [place]  Internal Medicine Clinical Pharmacist  [phone]

## 2022-01-27 VITALS
HEART RATE: 71 BPM | SYSTOLIC BLOOD PRESSURE: 127 MMHG | DIASTOLIC BLOOD PRESSURE: 66 MMHG | TEMPERATURE: 97.7 F | BODY MASS INDEX: 28.13 KG/M2 | WEIGHT: 213.2 LBS

## 2022-02-02 ENCOUNTER — OFFICE VISIT (OUTPATIENT)
Dept: NEPHROLOGY | Age: 67
End: 2022-02-02
Payer: MEDICARE

## 2022-02-02 ENCOUNTER — TELEPHONE (OUTPATIENT)
Dept: INTERNAL MEDICINE CLINIC | Age: 67
End: 2022-02-02

## 2022-02-02 VITALS
BODY MASS INDEX: 28.5 KG/M2 | OXYGEN SATURATION: 97 % | WEIGHT: 216 LBS | TEMPERATURE: 97 F | SYSTOLIC BLOOD PRESSURE: 111 MMHG | DIASTOLIC BLOOD PRESSURE: 67 MMHG | HEART RATE: 69 BPM

## 2022-02-02 DIAGNOSIS — R31.29 MICROSCOPIC HEMATURIA: ICD-10-CM

## 2022-02-02 DIAGNOSIS — I12.9 HYPERTENSIVE RENAL DISEASE, STAGE 1 THROUGH STAGE 4 OR UNSPECIFIED CHRONIC KIDNEY DISEASE: ICD-10-CM

## 2022-02-02 DIAGNOSIS — N18.31 STAGE 3A CHRONIC KIDNEY DISEASE (HCC): Primary | ICD-10-CM

## 2022-02-02 PROCEDURE — G8427 DOCREV CUR MEDS BY ELIG CLIN: HCPCS | Performed by: INTERNAL MEDICINE

## 2022-02-02 PROCEDURE — 4040F PNEUMOC VAC/ADMIN/RCVD: CPT | Performed by: INTERNAL MEDICINE

## 2022-02-02 PROCEDURE — 1123F ACP DISCUSS/DSCN MKR DOCD: CPT | Performed by: INTERNAL MEDICINE

## 2022-02-02 PROCEDURE — 3017F COLORECTAL CA SCREEN DOC REV: CPT | Performed by: INTERNAL MEDICINE

## 2022-02-02 PROCEDURE — 1036F TOBACCO NON-USER: CPT | Performed by: INTERNAL MEDICINE

## 2022-02-02 PROCEDURE — G8417 CALC BMI ABV UP PARAM F/U: HCPCS | Performed by: INTERNAL MEDICINE

## 2022-02-02 PROCEDURE — G8484 FLU IMMUNIZE NO ADMIN: HCPCS | Performed by: INTERNAL MEDICINE

## 2022-02-02 PROCEDURE — 99213 OFFICE O/P EST LOW 20 MIN: CPT | Performed by: INTERNAL MEDICINE

## 2022-02-02 NOTE — LETTER
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 Reviewed initial LibrePro CGM download. -Trends: V.  High: 1%, High: 7%, Target: 90%, Low: 2%              -Trend towards lows (70s) while sleeping ~6AM and before lunch              -Infrequent post-prandial spikes     Current Diabetes Pharmacotherapy:   Metformin 1000amg BID  Victoza 1.2mg dily  Levemir 18 units QHS  Pioglitazone 15mg daily     Plan:  Recommend decreasing to Levemir 16 units at bedtime for remainder of Freestyle Sy trial     Please call/fax back with your response to the above recommendation.        Thank you,     Myesha Yu, PharmD, [facility]  Internal Medicine Clinical Pharmacist  [phone]  (fax) [phone]

## 2022-02-02 NOTE — PROGRESS NOTES
tamsulosin (FLOMAX) 0.4 MG capsule Take 0.4 mg by mouth daily       metFORMIN (GLUCOPHAGE) 1000 MG tablet Take 1,000 mg by mouth daily (with breakfast)      sertraline (ZOLOFT) 100 MG tablet Take 100 mg by mouth daily      Liraglutide (VICTOZA) 18 MG/3ML SOPN SC injection Inject 1.2 mg as directed daily      LEVEMIR FLEXTOUCH 100 UNIT/ML injection pen Inject 18 Units into the skin nightly      glucose blood VI test strips (ONE TOUCH ULTRA TEST) strip Test BID AD Dx: 250.02 100 each 11    ONETOUCH DELICA LANCETS FINE MISC Test BID AD Dx: 250.02 100 each 11    Insulin Pen Needle (PEN NEEDLES) 31G X 6 MM MISC Use as needed for Lantus 100 each 3    Multiple Vitamin (MULTIVITAMIN PO) 1 Cap Oral DAILY       lisinopril (PRINIVIL;ZESTRIL) 10 MG tablet 1 Tab Oral DAILY       aspirin 81 MG chewable tablet 81 mg 1 Tab Oral MWF       pioglitazone (ACTOS) 15 MG tablet 1 Tab Oral DAILY        No current facility-administered medications for this visit. Laboratory & Diagnostics:  Old progress notes from referring physician reviewed. Radiology/US kidneys: R 9.9 cm, L 10.6, No stones  Dec 2021: K 4.8, creat 1.38, , Albumin 4.1, AST 19, ALT 14    Jan 2022: K 4.3, creat 1.4, UPCR 100 mg/g, Hb 13.5, UA: + blood, no protein     Impression/Plan:   1. Mild elevated creatinine/CKD III: Serum creatinine is stable at this time. At baseline. Not much proteinuria. US is okay. UA shows some blood, will monitor. 2. HTN: on lisinopril. Monitor BP at home and advised low salt diet. BP is stable. Continue with lisinopril. 3. IDDM: Not much proteinuria. 4. Enlarged prostate: on Flomax  5. Microscopic hematuria: ??prostate. No gross hematuria. Will monitor    Orders Placed This Encounter   Procedures    Basic Metabolic Panel    Urinalysis    Protein / creatinine ratio, urine     Return in about 7 months (around 9/2/2022).     Matt Wise MD  [facility]

## 2022-02-02 NOTE — TELEPHONE ENCOUNTER
Patient came in to have his Laureen. His download and food log are attached to this encounter.  Thank you

## 2022-02-09 ENCOUNTER — TELEPHONE (OUTPATIENT)
Dept: INTERNAL MEDICINE CLINIC | Age: 67
End: 2022-02-09

## 2022-02-09 ENCOUNTER — OFFICE VISIT (OUTPATIENT)
Dept: INTERNAL MEDICINE CLINIC | Age: 67
End: 2022-02-09
Payer: MEDICARE

## 2022-02-09 VITALS
WEIGHT: 213.2 LBS | TEMPERATURE: 97.3 F | HEART RATE: 71 BPM | SYSTOLIC BLOOD PRESSURE: 123 MMHG | BODY MASS INDEX: 28.13 KG/M2 | DIASTOLIC BLOOD PRESSURE: 61 MMHG

## 2022-02-09 DIAGNOSIS — E11.9 TYPE 2 DIABETES MELLITUS WITHOUT COMPLICATION, WITH LONG-TERM CURRENT USE OF INSULIN (HCC): Chronic | ICD-10-CM

## 2022-02-09 DIAGNOSIS — Z79.4 TYPE 2 DIABETES MELLITUS WITHOUT COMPLICATION, WITH LONG-TERM CURRENT USE OF INSULIN (HCC): Chronic | ICD-10-CM

## 2022-02-09 PROCEDURE — G0108 DIAB MANAGE TRN  PER INDIV: HCPCS | Performed by: INTERNAL MEDICINE

## 2022-02-09 NOTE — PROGRESS NOTES
[facility]  [facility] [address] [address], [facility], [address]  [phone] (phone)  [phone] (fax)    Patient ID: Ulises Gibbs 1955  Referring Provider: Dr. Soler Party     Patient's name and  were verified. Subjective:    He presents for His follow-up diabetic visit. He has type 2 diabetes mellitus. Home regimen includes: Insulin, Biguanide, TZD and GLP-1 Agonist He is compliant most of the time. Assessment:     Lab Results   Component Value Date    LABA1C 6.2 2022    LABA1C 5.9 2020    BUN 23 2021    CREATININE 1.38 2021     Vitals:    22 1019   BP: 123/61   Pulse: 71   Temp: 97.3 °F (36.3 °C)   Weight: 213 lb 3.2 oz (96.7 kg)     Wt Readings from Last 3 Encounters:   22 213 lb 3.2 oz (96.7 kg)   22 216 lb (98 kg)   22 213 lb 3.2 oz (96.7 kg)     Ht Readings from Last 3 Encounters:   21 6' 1\" (1.854 m)   02/10/21 6' 1\" (1.854 m)   20 6' 1\" (1.854 m)       Diabetes Pharmacotherapy:  Levemir 16 units nightly  Victoza 1.2 mg daily  Metformin 1000 mg daily with breakfast  Pioglitazone 15 mg daily    Glucose Trends:   Current monitoring regimen: home blood tests - 2 times daily and has been wearing Sy Pro for last 2 weeks  Home blood sugar trends:    -Sy Pro download: 0% very high, 10% high, target range 89%, low 1%, very low 0%   -Trends towards lows (70s) while sleeping and slight lows prior to lunch   -Infrequent post-prandial spikes  Any episodes of hypoglycemia? Yes, Patient experienced some slight lows prior to lunch during first week of wearing Sy Pro. Patient did not test at this time and did not have any symptoms of lows. However, he has had no lows since decreasing Levemir dose to 16 units nightly   -Treats with fruit juice    Focus:   Diabetes Education. A1C 6.2% on 2022. Patient presents today to discuss blood glucose trends seen on [employer] Pro downloads from the past two weeks.  Patient was experiencing some slightly low blood sugar readings prior to lunch during the first week of wearing the [facility], so adjusted Levemir dose to 16 units nightly. During second week of wearing Sy Pro, patient did not experience any low blood sugars. Discussed increasing Victoza dose to 1.8 mg daily due to cardiovascular benefits of the medication. With this, discussed a trial of discontinuing pioglitazone, as patient has experienced some slight lower leg edema in the past while on a higher dose of this medication. Patient agreeable to these changes. Patient is also interested in potentially discontinuing metformin in the future, as it has caused stomach upset in the past, and he does not feel that it is improving his blood sugar levels. He still has several months of metformin at home, so patient agreed that he is open to discussing this in a few months when he is out of the medication. DSME PLAN:   Discussed general issues about diabetes pathophysiology and management. Discussed ways to avoid symptomatic hypoglycemia. Discontinued Actos; see medication orders. Increased Victoza to 1.8 mg daily. 1. Plan to increase to Victoza 1.8mg daily and stop pioglitazone (we will given you a call if Dr. Tami Rodríguez has any other changes) Continue Levemir 16 units nightly. 2. Let us know if you'd like to do another LibrePro after 3 months    Meter download, medications, PMH and nursing assessment reviewed. Anna Moy states He is willing to participate in this plan of care and verbalized understanding of all instructions provided. Teach back used to verify comprehension. Follow-up: 6 months    Total time involved in direct patient education: 30 minutes.      For Rafael Cano in place:  No   Time Spent (min): 8134 Shane Argueta, PharmD, [facility]  Internal Medicine Clinical Pharmacist  [phone]

## 2022-02-09 NOTE — PATIENT INSTRUCTIONS
1. Plan to increase to Victoza 1.8mg daily and stop pioglitazone (we will given you a call if Dr. Nika Dow has any other changes. Continue Levemir 16 units.     2. Let us know if you'd like to do another LibrePro after 3 months

## 2022-02-09 NOTE — TELEPHONE ENCOUNTER
Marguerite Vizcarra (1955) is being seen by the DM Clinic for mangement of T2DM.     Glucose Trends (see attached for [facility]):   - 0% very high, 10% high, target range 89%, low 1%, very low 0%  - Trends towards lows (70s) while sleeping and slight lows prior to lunch  - Infrequent post-prandial spikes     Diabetes Pharmacotherapy:  Levemir 18 units nightly  Victoza 1.2 mg daily  Metformin 1000 mg daily with breakfast  Pioglitazone 15 mg daily    Lab Results   Component Value Date    LABA1C 6.2 (H) 01/26/2022        Recommendations:  1. Recommend decreasing Levemir to 16 units nightly. 2. Recommend increasing Victoza to 1.8 mg daily due to cardiovascular benefits of the medication and stopping pioglitazone 15mg daily to decrease pill burden. Please call/fax back with your response to the above changes.     Thank you,    Vidal Del Castillo, PharmD, [facility]  Internal Medicine Clinical Pharmacist  [phone]

## 2022-02-09 NOTE — LETTER
[facility]  [address] [address] [place], [address]  [phone] (phone)  [phone] (fax)    Chelsea Rico (1955) is being seen by the DM Clinic for mangement of T2DM.     Glucose Trends (see attached for [facility]):   - 0% very high, 10% high, 89% target range, 1% low  - Trends towards lows (70s) while sleeping and slight lows prior to lunch  - Infrequent post-prandial spikes     Diabetes Pharmacotherapy:  Levemir 18 units nightly  Victoza 1.2 mg daily  Metformin 1000 mg daily with breakfast  Pioglitazone 15 mg daily    Lab Results   Component Value Date    LABA1C 6.2 (H) 01/26/2022        Recommendations:  1. Recommend decreasing Levemir to 16 units nightly  2. Recommend increasing Victoza to 1.8 mg daily due to cardiovascular benefits of the medication and stopping pioglitazone 15mg daily to decrease pill burden. Please call/fax back with your response to the above recommendations.     Thank you,    Georigna Grove, PharmD, [facility]  Internal Medicine Clinical Pharmacist  [phone]

## 2022-05-20 ENCOUNTER — TELEPHONE (OUTPATIENT)
Dept: NEPHROLOGY | Age: 67
End: 2022-05-20

## 2022-05-20 NOTE — TELEPHONE ENCOUNTER
Patients' PCP (referring office) is requesting the office notes from the patient's visits.     New pt appt 12/17/2021  DOLV 02/02/2022    Fax # [phone]  Office # [phone]    * pt has an appt with them on Monday 05/23/2022 and they would like this prior to his appt

## 2022-05-23 ENCOUNTER — TELEPHONE (OUTPATIENT)
Dept: INTERNAL MEDICINE CLINIC | Age: 67
End: 2022-05-23

## 2022-05-23 LAB
AVERAGE GLUCOSE: NORMAL
HBA1C MFR BLD: 6.5 %

## 2022-05-23 NOTE — TELEPHONE ENCOUNTER
Received progress notes from Dr. Kiana Velázquez office. Per note Levemir FlexTouch dose changed from 18 Units daily to now being 16 units daily.

## 2022-08-09 ENCOUNTER — OFFICE VISIT (OUTPATIENT)
Dept: INTERNAL MEDICINE CLINIC | Age: 67
End: 2022-08-09
Payer: MEDICARE

## 2022-08-09 VITALS
BODY MASS INDEX: 27.75 KG/M2 | WEIGHT: 209.4 LBS | TEMPERATURE: 97.7 F | DIASTOLIC BLOOD PRESSURE: 60 MMHG | HEIGHT: 73 IN | SYSTOLIC BLOOD PRESSURE: 114 MMHG | HEART RATE: 72 BPM

## 2022-08-09 DIAGNOSIS — Z79.4 TYPE 2 DIABETES MELLITUS WITHOUT COMPLICATION, WITH LONG-TERM CURRENT USE OF INSULIN (HCC): Primary | Chronic | ICD-10-CM

## 2022-08-09 DIAGNOSIS — E11.9 TYPE 2 DIABETES MELLITUS WITHOUT COMPLICATION, WITH LONG-TERM CURRENT USE OF INSULIN (HCC): Primary | Chronic | ICD-10-CM

## 2022-08-09 PROCEDURE — G0108 DIAB MANAGE TRN  PER INDIV: HCPCS | Performed by: INTERNAL MEDICINE

## 2022-08-09 RX ORDER — LISINOPRIL 5 MG/1
5 TABLET ORAL DAILY
COMMUNITY
Start: 2022-06-02

## 2022-08-09 ASSESSMENT — PATIENT HEALTH QUESTIONNAIRE - PHQ9
SUM OF ALL RESPONSES TO PHQ QUESTIONS 1-9: 0
SUM OF ALL RESPONSES TO PHQ QUESTIONS 1-9: 0
SUM OF ALL RESPONSES TO PHQ9 QUESTIONS 1 & 2: 0
2. FEELING DOWN, DEPRESSED OR HOPELESS: 0
1. LITTLE INTEREST OR PLEASURE IN DOING THINGS: 0
SUM OF ALL RESPONSES TO PHQ QUESTIONS 1-9: 0
SUM OF ALL RESPONSES TO PHQ QUESTIONS 1-9: 0

## 2022-08-09 NOTE — PROGRESS NOTES
[facility]  [facility] [address] [address] [address], [facility], [address]  [phone] (phone)  [phone] (fax)    Patient ID: Jak Goldman 1955  Referring Provider: Dr. Janell Eden     Patient's name and  were verified. Subjective:    He presents for His follow-up diabetic visit. He has type 2 diabetes mellitus. Home regimen includes: Insulin, Biguanide, and GLP-1 Agonist He is noncompliant some of the time. Assessment:     Lab Results   Component Value Date/Time    LABA1C 6.5 2022 12:00 AM    BUN 23 2021 12:00 AM    CREATININE 1.38 2021 12:00 AM     Vitals:    22 1024 22 1026   BP: (!) 112/58 114/60   Site: Left Upper Arm Left Upper Arm   Position: Sitting Sitting   Pulse: 72    Temp: 97.7 °F (36.5 °C)    Weight: 209 lb 6.4 oz (95 kg)    Height: 6' 1\" (1.854 m)      Wt Readings from Last 3 Encounters:   22 209 lb 6.4 oz (95 kg)   22 213 lb 3.2 oz (96.7 kg)   22 216 lb (98 kg)     Ht Readings from Last 3 Encounters:   22 6' 1\" (1.854 m)   21 6' 1\" (1.854 m)   02/10/21 6' 1\" (1.854 m)       Diabetes Pharmacotherapy:  Metfromin 1000mg daily  Victoza 1.8mg daily  Levemir 18 units bedtime    Glucose Trends:     Current monitoring regimen: Fingerstick blood tests - 2 times daily  Home blood sugar trends:    -Fasting AM: 111-144   -Before lunch:   -Before dinner:   -Bedtime: 130-225  Any episodes of hypoglycemia? no   -Treats with n/a    Lifestyle Factors:   Previous visit with dietician: remote  Current diet: B: coffee/ cherrios            L: ham sandwich; chips; pudding                       D: pancakes (6); cookies                       Snacks: occas BT popcorn/ peanuts/ pretzel                       Beverages:  Current exercise: yard work 5-6 days per week    Health Maintenance:  Eye exam current (within one year): yes Date: 22, DR. Oviedo  Any history of foot problems? no  Foot exam up to date: yes Date: 2022, Dr. Mervin Castellano; [facility]  Immunizations up to date:    Pneumonia: no   COVID-19: yes  The 10-year ASCVD risk score (Amanda Kan, et al., 2013) is: 28.2%    Values used to calculate the score:      Age: 69 years      Sex: Male      Is Non- : No      Diabetic: Yes      Tobacco smoker: No      Systolic Blood Pressure: 256 mmHg      Is BP treated: Yes      HDL Cholesterol: 39 mg/dL      Total Cholesterol: 153 mg/dL   Last panel - LDL:   Lab Results   Component Value Date    LDLCALC 100 12/07/2021     Taking ASA:  Yes   Taking statin: No - patient declined  Last microalbumin/creatinine ratio:   Microalbumin Creatinine Ratio   Date Value Ref Range Status   03/12/2021 5.5  Final      Taking ACE/ARB: Yes- lisinopril  Depression screening completed. 8/9/22 Score 0    Focus:   Diabetes Education. Last A1C: 6.5% 5/23/22. Glucose levels are stable when aneesh \"eats the right things\". He does still take in snacks that have a moderate amount of carb. He states his blood sugars were better when he was taking Actos; that was stopped for potential fluid retention. He states he has not noted any changes in fluid retention with or without Actos. This can be discussed again moving forward. He is active with outdoor work, but as usual can benefit from an increased structured exercise routine. In order to make the best next therapy decisions, Clare Nelson would benefit from a professional CGM. Clare Nelson also would like to see where his overall glucose levels stand. Authorization received per Dr. Diogo Hendrix for [facility] to be completed. They do not want to do this now as they are leaving for vacation shortly and evaluating their normal routine would be more beneficial. Will plan to initiate CGMPro August 29, 2022.      Curriculum Area Focus: Diabetes pathophysiology, Foot care, Carbohydrate counting/meal planning, Hypoglycemia management, and Pattern management  DSME PLAN:     Try to keep limits on your carbohydrates for meals and snacks Don't add too many carbs at one time.         -avoid adding cookies to a meal with rice or pasta or pancakes, etc  Stay active--plan for your exercise program when you are not busy                    Johannewilnercolton Lois to come to the clinic August 29 to have a [employer] put on              10:30am   Goals Addressed                   This Visit's Progress     limit high fat meats and fried foods   Not on track     limit to 2 cookies after a meal   Not on track     riding bike   Not on track     Ride stationery bike at least 15-30 mins 5 days per week, when first getting home from work or right after evening meal.             Meter download, medications, PMH and nursing assessment reviewed with Marcy Burt states He is willing to participate in this plan of care and verbalized understanding of all instructions provided. Teach back used to verify comprehension. Follow-up: 5 weeks/ follow up to CGM Pro    Total time involved in direct patient education: 60 minutes.

## 2022-08-09 NOTE — PATIENT INSTRUCTIONS
Try to keep limits on your carbohydrates for meals and snacks       Don't add too many carbs at one time.         -avoid adding cookies to a meal with rice or pasta or pancakes, etc  Stay active--plan for your exercise program when you are not busy                    Ramon Abreu to come to the clinic August 29 to have a [facility] put on              10:30am

## 2022-08-29 ENCOUNTER — NURSE ONLY (OUTPATIENT)
Dept: INTERNAL MEDICINE CLINIC | Age: 67
End: 2022-08-29
Payer: MEDICARE

## 2022-08-29 DIAGNOSIS — E11.9 TYPE 2 DIABETES MELLITUS WITHOUT COMPLICATION, WITH LONG-TERM CURRENT USE OF INSULIN (HCC): Primary | Chronic | ICD-10-CM

## 2022-08-29 DIAGNOSIS — Z79.4 TYPE 2 DIABETES MELLITUS WITHOUT COMPLICATION, WITH LONG-TERM CURRENT USE OF INSULIN (HCC): Primary | Chronic | ICD-10-CM

## 2022-08-29 PROCEDURE — 95250 CONT GLUC MNTR PHYS/QHP EQP: CPT | Performed by: INTERNAL MEDICINE

## 2022-08-29 NOTE — PROGRESS NOTES
Per Dr. Gilda Thomas 's orders, Gypsy Warren presents for insertion of diagnostic [facility] professional CGM. Sy sensor inserted left upper arm. Sensor initiated via [employer] and repeated per system directions. Patient instructed the system is water resistant and can shower. To reinforce sensor with tape as needed if it begins to peel away from the skin. Patient given food log and asked to record all food and drink intake for the period of time sensor is being worn. Instructed to return in 1 week for sensor scan to obtain glucose information and bring food logs. Instructed that in 2 weeks sensor is to be removed and returned to the Diabetes Clinic in a baggie with name and  and food logs for review.

## 2022-09-06 ENCOUNTER — TELEPHONE (OUTPATIENT)
Dept: INTERNAL MEDICINE CLINIC | Age: 67
End: 2022-09-06

## 2022-09-06 NOTE — TELEPHONE ENCOUNTER
Patient stopped in to have his Laureen and brought his food log. It is attached to this encounter.  Thank you

## 2022-09-07 ENCOUNTER — OFFICE VISIT (OUTPATIENT)
Dept: NEPHROLOGY | Age: 67
End: 2022-09-07
Payer: MEDICARE

## 2022-09-07 VITALS
HEART RATE: 66 BPM | BODY MASS INDEX: 28.63 KG/M2 | WEIGHT: 217 LBS | OXYGEN SATURATION: 97 % | DIASTOLIC BLOOD PRESSURE: 72 MMHG | TEMPERATURE: 98.3 F | SYSTOLIC BLOOD PRESSURE: 111 MMHG

## 2022-09-07 DIAGNOSIS — N18.31 CHRONIC KIDNEY DISEASE, STAGE 3A (HCC): Primary | ICD-10-CM

## 2022-09-07 DIAGNOSIS — I12.9 HYPERTENSIVE RENAL DISEASE, STAGE 1 THROUGH STAGE 4 OR UNSPECIFIED CHRONIC KIDNEY DISEASE: ICD-10-CM

## 2022-09-07 PROCEDURE — G8417 CALC BMI ABV UP PARAM F/U: HCPCS | Performed by: INTERNAL MEDICINE

## 2022-09-07 PROCEDURE — 3017F COLORECTAL CA SCREEN DOC REV: CPT | Performed by: INTERNAL MEDICINE

## 2022-09-07 PROCEDURE — G8427 DOCREV CUR MEDS BY ELIG CLIN: HCPCS | Performed by: INTERNAL MEDICINE

## 2022-09-07 PROCEDURE — 1036F TOBACCO NON-USER: CPT | Performed by: INTERNAL MEDICINE

## 2022-09-07 PROCEDURE — 1123F ACP DISCUSS/DSCN MKR DOCD: CPT | Performed by: INTERNAL MEDICINE

## 2022-09-07 PROCEDURE — 99213 OFFICE O/P EST LOW 20 MIN: CPT | Performed by: INTERNAL MEDICINE

## 2022-09-07 NOTE — PROGRESS NOTES
[address] [facility]  [address] [address]  Dept: [phone]  Loc: [phone]  Office Progress Note  9/7/2022 10:54 AM      Pt Name:    Jarrod Khan  YOB: 1955  Primary Care Physician:  Cy Terry     Chief Complaint:   Chief Complaint   Patient presents with    Follow-up     7 months follow-up for CKD        Background Information/Interval History:   The patient is a 69 y.o. white male with hx DM for several years (over 15+ years), HTN who is here for follow-up evaluation of elevated creatinine. Patient reports hx HTN. He does report enlarged prostate and sees Dr. Sophie Vasquez. Reports weak stream and takes Flomax. He says he passed a kidney stone recently. Pt is here for 7 months follow-up. Feels okay. No new problems since last visit. Says sugars and BP are good. Did not bring any readings. Past History:  Past Medical History:   Diagnosis Date    Hypertension     Type II diabetes mellitus ([facility] [address]     diabetes 1998     Past Surgical History:   Procedure Laterality Date    CARDIOVASCULAR STRESS TEST  11/2018    WNL        VITALS:  /72 (Site: Right Upper Arm, Position: Sitting, Cuff Size: Large Adult)   Pulse 66   Temp 98.3 °F (36.8 °C)   Wt 217 lb (98.4 kg)   SpO2 97%   BMI 28.63 kg/m²   Wt Readings from Last 3 Encounters:   09/07/22 217 lb (98.4 kg)   08/09/22 209 lb 6.4 oz (95 kg)   02/09/22 213 lb 3.2 oz (96.7 kg)     Body mass index is 28.63 kg/m².      General Appearance: alert and cooperative with exam, appears comfortable, no distress  Oral: moist oral mucus membranes  Neck: No jugular venous distention  Lungs: Air entry B/L, no crackles or rales, no use of accessory muscles  Heart: S1, S2 heard  GI: soft, non-tender, no guarding  Extremities: No sig LE edema     Medications:  Current Outpatient Medications   Medication Sig Dispense Refill    lisinopril (PRINIVIL;ZESTRIL) 5 MG tablet Take 5 mg by mouth in the morning. tamsulosin (FLOMAX) 0.4 MG capsule Take 0.4 mg by mouth daily       metFORMIN (GLUCOPHAGE) 1000 MG tablet Take 1,000 mg by mouth daily (with breakfast)      sertraline (ZOLOFT) 100 MG tablet Take 100 mg by mouth daily      Liraglutide (VICTOZA) 18 MG/3ML SOPN SC injection Inject 1.8 mg as directed in the morning. LEVEMIR FLEXTOUCH 100 UNIT/ML injection pen Inject 15 Units into the skin nightly      glucose blood VI test strips (ONE TOUCH ULTRA TEST) strip Test BID AD Dx: 250.02 100 each 11    ONETOUCH DELICA LANCETS FINE MISC Test BID AD Dx: 250.02 100 each 11    Multiple Vitamin (MULTIVITAMIN PO) 1 Cap Oral DAILY       aspirin 81 MG chewable tablet 81 mg 1 Tab Oral MWF       Insulin Pen Needle (PEN NEEDLES) 31G X 6 MM MISC Use as needed for Lantus 100 each 3     No current facility-administered medications for this visit. Laboratory & Diagnostics:  Radiology/US kidneys: R 9.9 cm, L 10.6, No stones  Dec 2021: K 4.8, creat 1.38, , Albumin 4.1, AST 19, ALT 14    Jan 2022: K 4.3, creat 1.4, UPCR 100 mg/g, Hb 13.5, UA: + blood, no protein  Aug 2022: Creat 1.4, BUN 22, K 4.6, Bicarb 29, Ca 9.0, UPCR <100 mg/g, UA: no protein, trace blood, 0-2 RBC     Impression/Plan:   1. CKD III: Serum creatinine is stable at this time, Lytes are stable. Doing well overall from renal standpoint. Creatinine is 1.4  2. HTN: on lisinopril. Stable. 3. IDDM: Not much proteinuria. Following with PCP  4. Enlarged prostate: on Flomax    Orders Placed This Encounter   Procedures    Basic Metabolic Panel    Protein / creatinine ratio, urine     Return in about 1 year (around 9/7/2023).     Otto Ortez MD  [facility]

## 2022-09-07 NOTE — TELEPHONE ENCOUNTER
Reviewed Sy CGM download(s) from [facility].    -Trends: V. High: 0%, High: 4%, Target: 74%, Low:19%, V. Low: 3%   -Average Bmg/dL    Diabetes Pharmacotherapy:  Metfromin 1000mg daily  Victoza 1.8mg daily  Levemir 18 units bedtime    Assessment:   Sudha Castelan is experiencing a significant percentage of low blood sugars, especially overnight while sleeping. Dizzy spells after lunch. Asymptomatic for lows.     Recommendations/Plan:   Recommend decreasing the Lantus 15 units QHS (20% decrease) for the remainder of the [facility] trial.   After next week's appt, we will discuss permanent changes with Dr. Jennifer Stout Only    Time Spent (min): [address] Alfonso, PharmD, [facility]  Internal Medicine Clinical Pharmacist  [phone]

## 2022-09-15 ENCOUNTER — PHARMACY VISIT (OUTPATIENT)
Dept: INTERNAL MEDICINE CLINIC | Age: 67
End: 2022-09-15
Payer: MEDICARE

## 2022-09-15 DIAGNOSIS — E11.9 TYPE 2 DIABETES MELLITUS WITHOUT COMPLICATION, WITH LONG-TERM CURRENT USE OF INSULIN (HCC): Primary | ICD-10-CM

## 2022-09-15 DIAGNOSIS — Z79.4 TYPE 2 DIABETES MELLITUS WITHOUT COMPLICATION, WITH LONG-TERM CURRENT USE OF INSULIN (HCC): Primary | ICD-10-CM

## 2022-09-15 LAB — HBA1C MFR BLD: 6.6 %

## 2022-09-15 PROCEDURE — 83036 HEMOGLOBIN GLYCOSYLATED A1C: CPT | Performed by: INTERNAL MEDICINE

## 2022-09-15 PROCEDURE — G0108 DIAB MANAGE TRN  PER INDIV: HCPCS | Performed by: INTERNAL MEDICINE

## 2022-09-15 NOTE — PATIENT INSTRUCTIONS
Decrease from Levemir 15 to 12 units to stop the overnight lows    2. We will try a test claim for Trulicity - This is a once a week shot instead of daily.      Pharmacist Phone Number: [phone]

## 2023-02-20 ENCOUNTER — OFFICE VISIT (OUTPATIENT)
Dept: INTERNAL MEDICINE CLINIC | Age: 68
End: 2023-02-20
Payer: MEDICARE

## 2023-02-20 DIAGNOSIS — E11.9 TYPE 2 DIABETES MELLITUS WITHOUT COMPLICATION, WITH LONG-TERM CURRENT USE OF INSULIN (HCC): Primary | Chronic | ICD-10-CM

## 2023-02-20 DIAGNOSIS — Z79.4 TYPE 2 DIABETES MELLITUS WITHOUT COMPLICATION, WITH LONG-TERM CURRENT USE OF INSULIN (HCC): Primary | Chronic | ICD-10-CM

## 2023-02-20 PROCEDURE — G0108 DIAB MANAGE TRN  PER INDIV: HCPCS | Performed by: INTERNAL MEDICINE

## 2023-02-20 NOTE — PATIENT INSTRUCTIONS
Try your best to rotate your insulin and Victoza injection sites as much as possible    2.  Consider getting the new pneumonia vaccine (prevnar 20) at your local pharmacy

## 2023-02-20 NOTE — PROGRESS NOTES
[facility]  [address] [facility], [address]  [phone] (phone)  [phone] (fax)    Patient ID: Chi Anaya 1955  Referring Provider: Dr. Carrasquillo [place]     Patient's name and  were verified. Subjective:    He presents for His follow-up diabetic visit. He has type 2 diabetes mellitus. Home regimen includes: Insulin, Biguanide, and GLP-1 Agonist He is compliant a majority of the time.   Assessment:     Lab Results   Component Value Date/Time    LABA1C 6.6 09/15/2022 10:37 AM    BUN 23 2021 12:00 AM    CREATININE 1.38 2021 12:00 AM     Vitals:    23 0722   BP: (!) 122/59   Pulse: 81   Temp: 97.4 °F (36.3 °C)   Weight: 204 lb (92.5 kg)     Wt Readings from Last 3 Encounters:   23 204 lb (92.5 kg)   22 217 lb (98.4 kg)   22 209 lb 6.4 oz (95 kg)     Ht Readings from Last 3 Encounters:   22 6' 1\" (1.854 m)   21 6' 1\" (1.854 m)   02/10/21 6' 1\" (1.854 m)       Diabetes Pharmacotherapy:  Levemir 12 units HS  Metformin 1000mg daily  Victoza 1.8mg daily    Glucose Trends:   Current monitoring regimen: Fingerstick blood tests - 2 times daily  Home blood sugar trends:    -Fasting AM: 105-132   -Bedtime: 104-141  Any episodes of hypoglycemia? no   -Treats with candy bar    Lifestyle Factors:   Previous visit with dietician: remote  Current diet: B: coffee w/splash of milk, cheerios, oatmeal             L: sandwich with ham or summer sausage with one piece of bread                       D: pizza, 6 oz steak with broccoli and coleslaw                       Snacks: pudding, handful of potato chips                       Beverages: water, diet soda with supper occasionally   Current exercise:  15 minutes walking each day    Health Maintenance:  Eye exam current (within one year): yes, Dr. Veronica Rodriguez Date: 2022  Any history of foot problems? no  Foot exam up to date: yes, Dr. Dimitris Canas Date: 2022  Immunizations up to date:    Pneumonia: no   Influenza: yes  The 10-year ASCVD risk score (Ginny SORIANO, et al., 2019) is: 28.9%  Last panel - LDL:   Lab Results   Component Value Date    LDLCALC 100 12/07/2021   Taking ASA:  Yes   Taking statin: No - pt declined previously  Last microalbumin/creatinine ratio: overdue  Microalbumin Creatinine Ratio   Date Value Ref Range Status   03/12/2021 5.5  Final    Taking ACE/ARB: Yes- lisinopril  Depression screening completed 8/2022. Focus:   Diabetes Education. Last A1C: 6.7% (1/16/23) at Dr. Carlos Cavazos office - will obtain records. Fingerstick blood sugar readings are all at target with no known episodes of lows. Again offered Shirlie Gulling; however, Floydene Prude would like to wait until the next visit before repeating. Floydene Prude would be interested in converting Victoza to a once weekly injection once shortages improve. Curriculum Area Focus: Immunizations and Glucose checks/goals  DSME PLAN:     Try your best to rotate your insulin and Victoza injection sites as much as possible    2. Consider getting the new pneumonia vaccine (prevnar 21) at your local pharmacy     Goals Addressed                   This Visit's Progress     HEMOGLOBIN A1C < 7.0        limit high fat meats and fried foods   Not on track     limit to 2 cookies after a meal   On track     riding bike   On track     Ride stationery bike at least 15-30 mins 5 days per week, when first getting home from work or right after evening meal.               Meter download, medications, PMH and nursing assessment reviewed. Clementmarina Noon states He is willing to participate in this plan of care and verbalized understanding of all instructions provided. Teach back used to verify comprehension. Follow-up: 6 month RN    Total time involved in direct patient education: 30 minutes.      For Pharmacy Admin Tracking Only    Program: Medical Group  Time Spent (min): Timmy Guillory, PharmD, [facility]-ADM  Internal Medicine Clinical Pharmacist  [phone]

## 2023-02-21 VITALS
DIASTOLIC BLOOD PRESSURE: 59 MMHG | SYSTOLIC BLOOD PRESSURE: 122 MMHG | HEART RATE: 81 BPM | WEIGHT: 204 LBS | BODY MASS INDEX: 26.91 KG/M2 | TEMPERATURE: 97.4 F

## 2023-09-06 ENCOUNTER — OFFICE VISIT (OUTPATIENT)
Dept: NEPHROLOGY | Age: 68
End: 2023-09-06
Payer: MEDICARE

## 2023-09-06 VITALS
WEIGHT: 201 LBS | DIASTOLIC BLOOD PRESSURE: 75 MMHG | BODY MASS INDEX: 26.52 KG/M2 | OXYGEN SATURATION: 97 % | SYSTOLIC BLOOD PRESSURE: 129 MMHG | HEART RATE: 67 BPM

## 2023-09-06 DIAGNOSIS — N18.31 CHRONIC KIDNEY DISEASE, STAGE 3A (HCC): Primary | ICD-10-CM

## 2023-09-06 DIAGNOSIS — I12.9 HYPERTENSIVE RENAL DISEASE, STAGE 1 THROUGH STAGE 4 OR UNSPECIFIED CHRONIC KIDNEY DISEASE: ICD-10-CM

## 2023-09-06 PROCEDURE — 99213 OFFICE O/P EST LOW 20 MIN: CPT | Performed by: INTERNAL MEDICINE

## 2023-09-06 PROCEDURE — 3078F DIAST BP <80 MM HG: CPT | Performed by: INTERNAL MEDICINE

## 2023-09-06 PROCEDURE — G8417 CALC BMI ABV UP PARAM F/U: HCPCS | Performed by: INTERNAL MEDICINE

## 2023-09-06 PROCEDURE — 1036F TOBACCO NON-USER: CPT | Performed by: INTERNAL MEDICINE

## 2023-09-06 PROCEDURE — 3017F COLORECTAL CA SCREEN DOC REV: CPT | Performed by: INTERNAL MEDICINE

## 2023-09-06 PROCEDURE — 1123F ACP DISCUSS/DSCN MKR DOCD: CPT | Performed by: INTERNAL MEDICINE

## 2023-09-06 PROCEDURE — 3074F SYST BP LT 130 MM HG: CPT | Performed by: INTERNAL MEDICINE

## 2023-09-06 PROCEDURE — G8427 DOCREV CUR MEDS BY ELIG CLIN: HCPCS | Performed by: INTERNAL MEDICINE

## 2023-09-13 ENCOUNTER — OFFICE VISIT (OUTPATIENT)
Dept: INTERNAL MEDICINE CLINIC | Age: 68
End: 2023-09-13

## 2023-09-13 VITALS
SYSTOLIC BLOOD PRESSURE: 138 MMHG | DIASTOLIC BLOOD PRESSURE: 68 MMHG | BODY MASS INDEX: 26.96 KG/M2 | HEIGHT: 73 IN | TEMPERATURE: 97.3 F | WEIGHT: 203.4 LBS | HEART RATE: 68 BPM

## 2023-09-13 DIAGNOSIS — Z79.4 TYPE 2 DIABETES MELLITUS WITHOUT COMPLICATION, WITH LONG-TERM CURRENT USE OF INSULIN (HCC): Primary | Chronic | ICD-10-CM

## 2023-09-13 DIAGNOSIS — E11.9 TYPE 2 DIABETES MELLITUS WITHOUT COMPLICATION, WITH LONG-TERM CURRENT USE OF INSULIN (HCC): Primary | Chronic | ICD-10-CM

## 2023-09-13 ASSESSMENT — PATIENT HEALTH QUESTIONNAIRE - PHQ9
SUM OF ALL RESPONSES TO PHQ9 QUESTIONS 1 & 2: 0
SUM OF ALL RESPONSES TO PHQ QUESTIONS 1-9: 0
SUM OF ALL RESPONSES TO PHQ QUESTIONS 1-9: 0
1. LITTLE INTEREST OR PLEASURE IN DOING THINGS: 0
SUM OF ALL RESPONSES TO PHQ QUESTIONS 1-9: 0
2. FEELING DOWN, DEPRESSED OR HOPELESS: 0
SUM OF ALL RESPONSES TO PHQ QUESTIONS 1-9: 0

## 2023-09-13 NOTE — PATIENT INSTRUCTIONS
Ask about getting your flu vaccine and Prevnar 20 pneumonia vaccine  We will submit request for Dexcom G7 with transmitter  Watch blood sugars after vacation.  If evening readings trend higher         Than morning readings --consider moving Levemir to dose every            Morning and none at night  Check into Nov[employer] assistance program for               Your Victoza 1.8mg daily               Or Ozempic 1mg weekly           *give paperwork to Dr. Lissy Arechiga office and they can        Fax to the diabetes clinic if they need us to complete it  If you are still struggling with over 50% of your blood sugars         Being over goal  (130 int he morning and 140/150 at bedtime)        -consider adding a unit to your Levemir dosing

## 2024-03-13 ENCOUNTER — OFFICE VISIT (OUTPATIENT)
Dept: INTERNAL MEDICINE CLINIC | Age: 69
End: 2024-03-13
Payer: MEDICARE

## 2024-03-13 VITALS
DIASTOLIC BLOOD PRESSURE: 75 MMHG | SYSTOLIC BLOOD PRESSURE: 118 MMHG | WEIGHT: 203.2 LBS | BODY MASS INDEX: 26.81 KG/M2 | TEMPERATURE: 98.2 F | HEART RATE: 80 BPM

## 2024-03-13 DIAGNOSIS — Z79.4 TYPE 2 DIABETES MELLITUS WITHOUT COMPLICATION, WITH LONG-TERM CURRENT USE OF INSULIN (HCC): Primary | Chronic | ICD-10-CM

## 2024-03-13 DIAGNOSIS — E11.9 TYPE 2 DIABETES MELLITUS WITHOUT COMPLICATION, WITH LONG-TERM CURRENT USE OF INSULIN (HCC): Primary | Chronic | ICD-10-CM

## 2024-03-13 PROCEDURE — G0108 DIAB MANAGE TRN  PER INDIV: HCPCS

## 2024-03-13 PROCEDURE — NBSRV NON-BILLABLE SERVICE

## 2024-03-13 NOTE — PROGRESS NOTES
[facility]  [address] [place] [address]  [phone] (phone)  [phone] (fax)    Patient ID: Keaton Mar 1955  Referring Provider: Dr. Herzog     Patient's name and  were verified.    Subjective:    He presents for a follow-up diabetic visit. He has type 2 diabetes mellitus. He is compliant a majority of the time.  Assessment:     Lab Results   Component Value Date/Time    LABA1C 6.8 2024 11:34 AM    BUN 23 2021 12:00 AM    CREATININE 1.38 2021 12:00 AM     Vitals:    24 1141   BP: 118/75   Pulse: 80   Temp: 98.2 °F (36.8 °C)   Weight: 92.2 kg (203 lb 3.2 oz)     Wt Readings from Last 3 Encounters:   24 92.2 kg (203 lb 3.2 oz)   23 92.3 kg (203 lb 6.4 oz)   23 91.2 kg (201 lb)     Ht Readings from Last 3 Encounters:   23 1.854 m (6' 1\")   22 1.854 m (6' 1\")   21 1.854 m (6' 1\")     Est, Carlos Filt Rate   Date Value Ref Range Status   2021 51  Final         Diabetes Pharmacotherapy:  Metformin 1000mg daily  Victoza 1.8mg daily  Levemir 12 units QHS    Glucose Trends:   Current monitoring regimen: Fingerstick blood tests - 1-2 times daily  Home blood sugar trends:               -Fasting AM: 134, 143, 162, 194, 185, 21   -Before lunch:   -Before dinner: 127, 110, 123, 161, 126, 114    -Bedtime:  Any episodes of hypoglycemia? Infrequent   -One overnight low   -Treats with candy bar    Lifestyle Factors:   Previous visit with dietician: no  Current diet: B: oatmeal            L: ham sandwich, potatoe chips, grapes, pudding, cookies                       D: pork  chop, fried potatoes, grapes, roll                       Snacks: mixed nuts or granola bar or apple w/ peanut butter                       Beverages: coffee, water  Current exercise:  yard work in the afternoons while working    Health Maintenance:  Diabetes Management   Topic Date Due    Diabetic retinal exam  2022    Diabetic Alb to Cr ratio (uACR)

## 2024-03-13 NOTE — PATIENT INSTRUCTIONS
Pharmacist will call on the Third Age Application    2. Levemir is being discontinued, time to switch to Lantus    3. Get the Prevnar 20 pneumonia vaccine at your local pharmacy    4. Irma will be resending the Dexcom G7 prescription to [facility] - will need a signature from Dr. Herzog

## 2024-09-16 ENCOUNTER — OFFICE VISIT (OUTPATIENT)
Dept: INTERNAL MEDICINE CLINIC | Age: 69
End: 2024-09-16

## 2024-09-16 VITALS
SYSTOLIC BLOOD PRESSURE: 136 MMHG | BODY MASS INDEX: 26.48 KG/M2 | WEIGHT: 199.8 LBS | HEIGHT: 73 IN | DIASTOLIC BLOOD PRESSURE: 68 MMHG | HEART RATE: 75 BPM | TEMPERATURE: 97.2 F

## 2024-09-16 DIAGNOSIS — Z79.4 TYPE 2 DIABETES MELLITUS WITHOUT COMPLICATION, WITH LONG-TERM CURRENT USE OF INSULIN (HCC): Primary | ICD-10-CM

## 2024-09-16 DIAGNOSIS — E11.9 TYPE 2 DIABETES MELLITUS WITHOUT COMPLICATION, WITH LONG-TERM CURRENT USE OF INSULIN (HCC): Primary | ICD-10-CM

## 2024-09-16 PROCEDURE — NBSRV NON-BILLABLE SERVICE: Performed by: REGISTERED NURSE

## 2024-09-16 RX ORDER — INSULIN GLARGINE 100 [IU]/ML
12 INJECTION, SOLUTION SUBCUTANEOUS NIGHTLY
COMMUNITY
Start: 2024-07-19

## 2024-09-16 RX ORDER — SEMAGLUTIDE 1.34 MG/ML
1 INJECTION, SOLUTION SUBCUTANEOUS
COMMUNITY

## 2024-09-16 ASSESSMENT — PATIENT HEALTH QUESTIONNAIRE - PHQ9
1. LITTLE INTEREST OR PLEASURE IN DOING THINGS: NOT AT ALL
SUM OF ALL RESPONSES TO PHQ QUESTIONS 1-9: 0
SUM OF ALL RESPONSES TO PHQ9 QUESTIONS 1 & 2: 0
2. FEELING DOWN, DEPRESSED OR HOPELESS: NOT AT ALL
SUM OF ALL RESPONSES TO PHQ QUESTIONS 1-9: 0

## 2024-10-03 DIAGNOSIS — N18.31 CHRONIC KIDNEY DISEASE, STAGE 3A (HCC): Primary | ICD-10-CM

## 2024-10-03 DIAGNOSIS — I12.9 HYPERTENSIVE RENAL DISEASE, STAGE 1 THROUGH STAGE 4 OR UNSPECIFIED CHRONIC KIDNEY DISEASE: ICD-10-CM

## 2024-10-11 ENCOUNTER — OFFICE VISIT (OUTPATIENT)
Dept: NEPHROLOGY | Age: 69
End: 2024-10-11
Payer: MEDICARE

## 2024-10-11 VITALS
HEART RATE: 64 BPM | HEIGHT: 73 IN | OXYGEN SATURATION: 100 % | SYSTOLIC BLOOD PRESSURE: 117 MMHG | WEIGHT: 200 LBS | DIASTOLIC BLOOD PRESSURE: 77 MMHG | BODY MASS INDEX: 26.51 KG/M2

## 2024-10-11 DIAGNOSIS — N18.31 CHRONIC KIDNEY DISEASE, STAGE 3A (HCC): Primary | ICD-10-CM

## 2024-10-11 DIAGNOSIS — E11.22 TYPE 2 DIABETES MELLITUS WITH STAGE 3A CHRONIC KIDNEY DISEASE, WITH LONG-TERM CURRENT USE OF INSULIN (HCC): ICD-10-CM

## 2024-10-11 DIAGNOSIS — Z79.4 TYPE 2 DIABETES MELLITUS WITH STAGE 3A CHRONIC KIDNEY DISEASE, WITH LONG-TERM CURRENT USE OF INSULIN (HCC): ICD-10-CM

## 2024-10-11 DIAGNOSIS — N18.31 TYPE 2 DIABETES MELLITUS WITH STAGE 3A CHRONIC KIDNEY DISEASE, WITH LONG-TERM CURRENT USE OF INSULIN (HCC): ICD-10-CM

## 2024-10-11 PROBLEM — N47.1 PHIMOSIS: Status: ACTIVE | Noted: 2022-12-04

## 2024-10-11 PROBLEM — N40.0 BENIGN PROSTATIC HYPERPLASIA: Status: ACTIVE | Noted: 2023-01-16

## 2024-10-11 PROBLEM — R63.4 UNEXPLAINED WEIGHT LOSS: Status: ACTIVE | Noted: 2023-01-26

## 2024-10-11 PROBLEM — R42 LIGHTHEADEDNESS: Status: ACTIVE | Noted: 2024-10-11

## 2024-10-11 PROCEDURE — 3017F COLORECTAL CA SCREEN DOC REV: CPT | Performed by: INTERNAL MEDICINE

## 2024-10-11 PROCEDURE — 1123F ACP DISCUSS/DSCN MKR DOCD: CPT | Performed by: INTERNAL MEDICINE

## 2024-10-11 PROCEDURE — 99213 OFFICE O/P EST LOW 20 MIN: CPT | Performed by: INTERNAL MEDICINE

## 2024-10-11 PROCEDURE — G8419 CALC BMI OUT NRM PARAM NOF/U: HCPCS | Performed by: INTERNAL MEDICINE

## 2024-10-11 PROCEDURE — 2022F DILAT RTA XM EVC RTNOPTHY: CPT | Performed by: INTERNAL MEDICINE

## 2024-10-11 PROCEDURE — G8427 DOCREV CUR MEDS BY ELIG CLIN: HCPCS | Performed by: INTERNAL MEDICINE

## 2024-10-11 PROCEDURE — G8484 FLU IMMUNIZE NO ADMIN: HCPCS | Performed by: INTERNAL MEDICINE

## 2024-10-11 PROCEDURE — 3044F HG A1C LEVEL LT 7.0%: CPT | Performed by: INTERNAL MEDICINE

## 2024-10-11 PROCEDURE — 1036F TOBACCO NON-USER: CPT | Performed by: INTERNAL MEDICINE

## 2024-10-11 PROCEDURE — 3074F SYST BP LT 130 MM HG: CPT | Performed by: INTERNAL MEDICINE

## 2024-10-11 PROCEDURE — 3078F DIAST BP <80 MM HG: CPT | Performed by: INTERNAL MEDICINE

## 2024-10-11 NOTE — PROGRESS NOTES
[facility]  [facility]  [address]  [facility] [address]  Dept: [phone]  Loc: [phone]  Office Progress Note  10/11/2024 10:17 AM      Pt Name:    Keaton Mar  YOB: 1955  Primary Care Physician:  Ari Herzog MD     Chief Complaint:   Chief Complaint   Patient presents with    Follow-up     CKD         Background Information/Interval History:   The patient is a 69 y.o. white male with hx DM for several years (over 15+ years), HTN who is here for follow-up evaluation of elevated creatinine. Patient reports hx HTN. He does report enlarged prostate and sees Dr. Varghese. Reports weak stream and takes Flomax.    Pt says he did not tolerate lisinopril- says it was making him dizzy. So PCP stopped it in coordination with his cardiology.     Pt is here for 12 months follow-up. He says sugars are stable at home. BP is in 120s range. No urinary complaints.      Past History:  Past Medical History:   Diagnosis Date    Hypertension     Type II diabetes mellitus (HCC)     diabetes 1998     Past Surgical History:   Procedure Laterality Date    CARDIOVASCULAR STRESS TEST  11/2018    WNL        VITALS:  /77 (Site: Right Upper Arm, Position: Sitting, Cuff Size: Large Adult)   Pulse 64   Ht 1.854 m (6' 1\")   Wt 90.7 kg (200 lb)   SpO2 100%   BMI 26.39 kg/m²   Wt Readings from Last 3 Encounters:   10/11/24 90.7 kg (200 lb)   09/16/24 90.6 kg (199 lb 12.8 oz)   03/13/24 92.2 kg (203 lb 3.2 oz)     Body mass index is 26.39 kg/m².     General Appearance: alert and cooperative with exam, appears comfortable, no distress  Oral: moist oral mucus membranes  Neck: No jugular venous distention  Lungs: Air entry B/L, no crackles or rales, no use of accessory muscles  Heart: S1, S2 heard  GI: soft, non-tender, no guarding  Extremities: No sig LE edema     Medications:  Current Outpatient Medications   Medication Sig Dispense Refill

## 2025-03-19 ENCOUNTER — OFFICE VISIT (OUTPATIENT)
Dept: INTERNAL MEDICINE CLINIC | Age: 70
End: 2025-03-19
Payer: MEDICARE

## 2025-03-19 DIAGNOSIS — Z79.4 TYPE 2 DIABETES MELLITUS WITHOUT COMPLICATION, WITH LONG-TERM CURRENT USE OF INSULIN: Primary | ICD-10-CM

## 2025-03-19 DIAGNOSIS — E11.9 TYPE 2 DIABETES MELLITUS WITHOUT COMPLICATION, WITH LONG-TERM CURRENT USE OF INSULIN: Primary | ICD-10-CM

## 2025-03-19 LAB — HBA1C MFR BLD: 6.6 % (ref 4.3–5.7)

## 2025-03-19 PROCEDURE — 83036 HEMOGLOBIN GLYCOSYLATED A1C: CPT | Performed by: PHARMACIST

## 2025-03-19 PROCEDURE — G0108 DIAB MANAGE TRN  PER INDIV: HCPCS | Performed by: PHARMACIST

## 2025-03-19 RX ORDER — INSULIN GLARGINE-YFGN 100 [IU]/ML
INJECTION, SOLUTION SUBCUTANEOUS
COMMUNITY
Start: 2025-01-26

## 2025-03-19 RX ORDER — ACYCLOVIR 400 MG/1
TABLET ORAL
COMMUNITY
Start: 2024-12-10

## 2025-03-19 NOTE — PROGRESS NOTES
light/Greek yogurt  -Nuts/peanut butter  -Hard boiled eggs, lunchmeat, turkey sausage    4. Upload your Dexcom at home and call into the clinic if you start to notice more lows (especially overnight)       Goals Addressed                   This Visit's Progress     Blood Pressure < 130/80   124/72     limit high fat meats and fried foods   On track     limit to 2 cookies after a meal   On track     riding bike   Not on track     Ride stationery bike at least 15-30 mins 5 days per week, when first getting home from work or right after evening meal.               Meter download, medications, PMH and nursing assessment reviewed.  Keaton Mar states He is willing to participate in this plan of care and verbalized understanding of all instructions provided. Teach back used to verify comprehension.      Follow-up: 2 week download, 6 month DM Clinic     Total time involved in direct patient education: 60 minutes.   Individual Education appointment justified due to: Class Availability    For Pharmacy Admin Tracking Only    Program: Medical Group  CPA in place:  No  Recommendation Provided To: Patient/Caregiver: 1 via In person  Intervention Detail: Dose Adjustment: 1, reason: Therapy De-escalation  Intervention Accepted By: Patient/Caregiver: 1  Gap Closed?: No   Time Spent (min): 60        Lidia Ortega, PharmD, BCPS, BC-ADM  Internal Medicine Clinical Pharmacist  [phone]

## 2025-03-19 NOTE — PATIENT INSTRUCTIONS
Decrease Semglee to 10 units in the morning (for safety)    2. We will discuss possibly increasing your Ozempic in 2 weeks   -Please upload your Dexcom from home on 4/1    3. -Consider protein drink (Ensure Protein/Premier Protein/Fairlife protein/Glucerna protein)/protein powder  -Consider Atkins snacks/bars  -Consider low fat cheese stick, light/Greek yogurt  -Nuts/peanut butter  -Hard boiled eggs, lunchmeat, turkey sausage    4. Upload your Dexcom at home and call into the clinic if you start to notice more lows (especially overnight)

## 2025-03-20 VITALS — DIASTOLIC BLOOD PRESSURE: 72 MMHG | SYSTOLIC BLOOD PRESSURE: 124 MMHG
